# Patient Record
Sex: MALE | Race: WHITE | NOT HISPANIC OR LATINO | URBAN - METROPOLITAN AREA
[De-identification: names, ages, dates, MRNs, and addresses within clinical notes are randomized per-mention and may not be internally consistent; named-entity substitution may affect disease eponyms.]

---

## 2019-11-09 ENCOUNTER — EMERGENCY (EMERGENCY)
Facility: HOSPITAL | Age: 29
LOS: 1 days | Discharge: HOME | End: 2019-11-09
Attending: EMERGENCY MEDICINE | Admitting: EMERGENCY MEDICINE
Payer: MEDICAID

## 2019-11-09 VITALS
HEIGHT: 71 IN | WEIGHT: 145.06 LBS | DIASTOLIC BLOOD PRESSURE: 75 MMHG | TEMPERATURE: 98 F | OXYGEN SATURATION: 100 % | RESPIRATION RATE: 20 BRPM | HEART RATE: 76 BPM | SYSTOLIC BLOOD PRESSURE: 130 MMHG

## 2019-11-09 VITALS
TEMPERATURE: 98 F | HEART RATE: 48 BPM | DIASTOLIC BLOOD PRESSURE: 74 MMHG | RESPIRATION RATE: 18 BRPM | OXYGEN SATURATION: 99 % | SYSTOLIC BLOOD PRESSURE: 120 MMHG

## 2019-11-09 LAB
ALBUMIN SERPL ELPH-MCNC: 4.8 G/DL — SIGNIFICANT CHANGE UP (ref 3.3–5)
ALP SERPL-CCNC: 65 U/L — SIGNIFICANT CHANGE UP (ref 40–120)
ALT FLD-CCNC: 20 U/L — SIGNIFICANT CHANGE UP (ref 10–45)
AMPHET UR-MCNC: NEGATIVE — SIGNIFICANT CHANGE UP
ANION GAP SERPL CALC-SCNC: 13 MMOL/L — SIGNIFICANT CHANGE UP (ref 5–17)
AST SERPL-CCNC: 21 U/L — SIGNIFICANT CHANGE UP (ref 10–40)
BARBITURATES UR SCN-MCNC: NEGATIVE — SIGNIFICANT CHANGE UP
BASOPHILS # BLD AUTO: 0.04 K/UL — SIGNIFICANT CHANGE UP (ref 0–0.2)
BASOPHILS NFR BLD AUTO: 0.9 % — SIGNIFICANT CHANGE UP (ref 0–2)
BENZODIAZ UR-MCNC: NEGATIVE — SIGNIFICANT CHANGE UP
BILIRUB SERPL-MCNC: 0.2 MG/DL — SIGNIFICANT CHANGE UP (ref 0.2–1.2)
BUN SERPL-MCNC: 18 MG/DL — SIGNIFICANT CHANGE UP (ref 7–23)
CALCIUM SERPL-MCNC: 9.6 MG/DL — SIGNIFICANT CHANGE UP (ref 8.4–10.5)
CHLORIDE SERPL-SCNC: 101 MMOL/L — SIGNIFICANT CHANGE UP (ref 96–108)
CK MB CFR SERPL CALC: 2.4 NG/ML — SIGNIFICANT CHANGE UP (ref 0–6.7)
CK SERPL-CCNC: 239 U/L — HIGH (ref 30–200)
CO2 SERPL-SCNC: 28 MMOL/L — SIGNIFICANT CHANGE UP (ref 22–31)
COCAINE METAB.OTHER UR-MCNC: NEGATIVE — SIGNIFICANT CHANGE UP
CREAT SERPL-MCNC: 1.03 MG/DL — SIGNIFICANT CHANGE UP (ref 0.5–1.3)
EOSINOPHIL # BLD AUTO: 0.13 K/UL — SIGNIFICANT CHANGE UP (ref 0–0.5)
EOSINOPHIL NFR BLD AUTO: 2.8 % — SIGNIFICANT CHANGE UP (ref 0–6)
ETHANOL SERPL-MCNC: <10 MG/DL — SIGNIFICANT CHANGE UP (ref 0–10)
GLUCOSE SERPL-MCNC: 122 MG/DL — HIGH (ref 70–99)
HCT VFR BLD CALC: 41.1 % — SIGNIFICANT CHANGE UP (ref 39–50)
HGB BLD-MCNC: 14.1 G/DL — SIGNIFICANT CHANGE UP (ref 13–17)
IMM GRANULOCYTES NFR BLD AUTO: 0.4 % — SIGNIFICANT CHANGE UP (ref 0–1.5)
LACTATE SERPL-SCNC: 3.6 MMOL/L — HIGH (ref 0.5–2)
LYMPHOCYTES # BLD AUTO: 1.69 K/UL — SIGNIFICANT CHANGE UP (ref 1–3.3)
LYMPHOCYTES # BLD AUTO: 36.1 % — SIGNIFICANT CHANGE UP (ref 13–44)
MCHC RBC-ENTMCNC: 29.9 PG — SIGNIFICANT CHANGE UP (ref 27–34)
MCHC RBC-ENTMCNC: 34.3 GM/DL — SIGNIFICANT CHANGE UP (ref 32–36)
MCV RBC AUTO: 87.1 FL — SIGNIFICANT CHANGE UP (ref 80–100)
METHADONE UR-MCNC: NEGATIVE — SIGNIFICANT CHANGE UP
MONOCYTES # BLD AUTO: 0.31 K/UL — SIGNIFICANT CHANGE UP (ref 0–0.9)
MONOCYTES NFR BLD AUTO: 6.6 % — SIGNIFICANT CHANGE UP (ref 2–14)
NEUTROPHILS # BLD AUTO: 2.49 K/UL — SIGNIFICANT CHANGE UP (ref 1.8–7.4)
NEUTROPHILS NFR BLD AUTO: 53.2 % — SIGNIFICANT CHANGE UP (ref 43–77)
NRBC # BLD: 0 /100 WBCS — SIGNIFICANT CHANGE UP (ref 0–0)
OPIATES UR-MCNC: NEGATIVE — SIGNIFICANT CHANGE UP
PCP SPEC-MCNC: SIGNIFICANT CHANGE UP
PCP UR-MCNC: NEGATIVE — SIGNIFICANT CHANGE UP
PLATELET # BLD AUTO: 179 K/UL — SIGNIFICANT CHANGE UP (ref 150–400)
POTASSIUM SERPL-MCNC: 4 MMOL/L — SIGNIFICANT CHANGE UP (ref 3.5–5.3)
POTASSIUM SERPL-SCNC: 4 MMOL/L — SIGNIFICANT CHANGE UP (ref 3.5–5.3)
PROT SERPL-MCNC: 7.2 G/DL — SIGNIFICANT CHANGE UP (ref 6–8.3)
RBC # BLD: 4.72 M/UL — SIGNIFICANT CHANGE UP (ref 4.2–5.8)
RBC # FLD: 11.5 % — SIGNIFICANT CHANGE UP (ref 10.3–14.5)
SODIUM SERPL-SCNC: 142 MMOL/L — SIGNIFICANT CHANGE UP (ref 135–145)
THC UR QL: NEGATIVE — SIGNIFICANT CHANGE UP
TROPONIN T SERPL-MCNC: <0.01 NG/ML — SIGNIFICANT CHANGE UP (ref 0–0.01)
VALPROATE SERPL-MCNC: <2.8 UG/ML — LOW (ref 50–100)
WBC # BLD: 4.68 K/UL — SIGNIFICANT CHANGE UP (ref 3.8–10.5)
WBC # FLD AUTO: 4.68 K/UL — SIGNIFICANT CHANGE UP (ref 3.8–10.5)

## 2019-11-09 PROCEDURE — 73130 X-RAY EXAM OF HAND: CPT | Mod: 26,LT

## 2019-11-09 PROCEDURE — 99284 EMERGENCY DEPT VISIT MOD MDM: CPT | Mod: 25

## 2019-11-09 PROCEDURE — 82553 CREATINE MB FRACTION: CPT

## 2019-11-09 PROCEDURE — 70486 CT MAXILLOFACIAL W/O DYE: CPT | Mod: 26

## 2019-11-09 PROCEDURE — 70450 CT HEAD/BRAIN W/O DYE: CPT | Mod: 26

## 2019-11-09 PROCEDURE — 73130 X-RAY EXAM OF HAND: CPT

## 2019-11-09 PROCEDURE — 93010 ELECTROCARDIOGRAM REPORT: CPT

## 2019-11-09 PROCEDURE — 93005 ELECTROCARDIOGRAM TRACING: CPT

## 2019-11-09 PROCEDURE — 83605 ASSAY OF LACTIC ACID: CPT

## 2019-11-09 PROCEDURE — 72125 CT NECK SPINE W/O DYE: CPT

## 2019-11-09 PROCEDURE — 80164 ASSAY DIPROPYLACETIC ACD TOT: CPT

## 2019-11-09 PROCEDURE — 71045 X-RAY EXAM CHEST 1 VIEW: CPT | Mod: 26

## 2019-11-09 PROCEDURE — 84484 ASSAY OF TROPONIN QUANT: CPT

## 2019-11-09 PROCEDURE — 82962 GLUCOSE BLOOD TEST: CPT

## 2019-11-09 PROCEDURE — 70450 CT HEAD/BRAIN W/O DYE: CPT

## 2019-11-09 PROCEDURE — 36415 COLL VENOUS BLD VENIPUNCTURE: CPT

## 2019-11-09 PROCEDURE — 80053 COMPREHEN METABOLIC PANEL: CPT

## 2019-11-09 PROCEDURE — 72125 CT NECK SPINE W/O DYE: CPT | Mod: 26

## 2019-11-09 PROCEDURE — 99285 EMERGENCY DEPT VISIT HI MDM: CPT

## 2019-11-09 PROCEDURE — 85025 COMPLETE CBC W/AUTO DIFF WBC: CPT

## 2019-11-09 PROCEDURE — 71045 X-RAY EXAM CHEST 1 VIEW: CPT

## 2019-11-09 PROCEDURE — 70486 CT MAXILLOFACIAL W/O DYE: CPT

## 2019-11-09 PROCEDURE — 96374 THER/PROPH/DIAG INJ IV PUSH: CPT

## 2019-11-09 PROCEDURE — 80307 DRUG TEST PRSMV CHEM ANLYZR: CPT

## 2019-11-09 PROCEDURE — 82550 ASSAY OF CK (CPK): CPT

## 2019-11-09 RX ORDER — LEVETIRACETAM 250 MG/1
1 TABLET, FILM COATED ORAL
Qty: 60 | Refills: 0
Start: 2019-11-09 | End: 2019-12-08

## 2019-11-09 RX ORDER — LEVETIRACETAM 250 MG/1
1000 TABLET, FILM COATED ORAL ONCE
Refills: 0 | Status: COMPLETED | OUTPATIENT
Start: 2019-11-09 | End: 2019-11-09

## 2019-11-09 RX ORDER — SODIUM CHLORIDE 9 MG/ML
1000 INJECTION INTRAMUSCULAR; INTRAVENOUS; SUBCUTANEOUS ONCE
Refills: 0 | Status: COMPLETED | OUTPATIENT
Start: 2019-11-09 | End: 2019-11-09

## 2019-11-09 RX ORDER — TETANUS TOXOID, REDUCED DIPHTHERIA TOXOID AND ACELLULAR PERTUSSIS VACCINE, ADSORBED 5; 2.5; 8; 8; 2.5 [IU]/.5ML; [IU]/.5ML; UG/.5ML; UG/.5ML; UG/.5ML
0.5 SUSPENSION INTRAMUSCULAR ONCE
Refills: 0 | Status: COMPLETED | OUTPATIENT
Start: 2019-11-09 | End: 2019-11-09

## 2019-11-09 RX ORDER — TETANUS TOXOID, REDUCED DIPHTHERIA TOXOID AND ACELLULAR PERTUSSIS VACCINE, ADSORBED 5; 2.5; 8; 8; 2.5 [IU]/.5ML; [IU]/.5ML; UG/.5ML; UG/.5ML; UG/.5ML
0.5 SUSPENSION INTRAMUSCULAR ONCE
Refills: 0 | Status: DISCONTINUED | OUTPATIENT
Start: 2019-11-09 | End: 2019-11-09

## 2019-11-09 RX ADMIN — LEVETIRACETAM 400 MILLIGRAM(S): 250 TABLET, FILM COATED ORAL at 09:30

## 2019-11-09 RX ADMIN — SODIUM CHLORIDE 1000 MILLILITER(S): 9 INJECTION INTRAMUSCULAR; INTRAVENOUS; SUBCUTANEOUS at 09:15

## 2019-11-09 NOTE — ED PROVIDER NOTE - NSFOLLOWUPINSTRUCTIONS_ED_ALL_ED_FT
Seizure, Adult  A seizure is a sudden burst of abnormal electrical activity in the brain. The abnormal activity temporarily interrupts normal brain function, causing a person to experience any of the following:  Involuntary movements.Changes in awareness or consciousness.Uncontrollable shaking (convulsions).Seizures usually last from 30 seconds to 2 minutes. They usually do not cause permanent brain damage unless they are prolonged.  What are the causes?  This condition may be caused by:  Fever.Low blood sugar.Medicine.Illness.Brain injury.Brain tumor.Stroke.A condition that is passed from parent to child (genetic).Addiction to a substance (substanceuse disorder) or suddenly stopping the use of a substance (withdrawal).Some people who have a seizure never have another one. People who have repeated seizures have a condition called epilepsy.  What are the signs or symptoms?  Symptoms of this condition vary greatly from person to person. They include:  Convulsions.Stiffening of the body.Involuntary movements of the arms or legs.Loss of consciousness.Breathing problems.Falling suddenly.Confusion.Head nodding.Eye blinking or fluttering.Lip smacking or tongue biting.Drooling.Rapid eye movements.Grunting.Loss of bladder control and bowel control.Staring.Unresponsiveness.Some people have symptoms right before a seizure happens (aura) and right after a seizure happens.  Symptoms that may occur before a seizure include:  Fear or anxiety.Nausea.Feeling like the room is spinning (vertigo).A feeling of having seen or heard something before (déjà vu).Odd tastes or smells.Changes in vision, such as seeing flashing lights or spots.Symptoms that may occur after a seizure include:  Confusion.Sleepiness.Headache.Weakness on one side of the body.How is this diagnosed?  This condition may be diagnosed based on medical history and physical exam.  You may also have other tests, including:  Blood tests.Electroencephalogram, EEG.CT scan.MRI.Spinal tap (lumbar puncture).How is this treated?  Most seizures will stop on their own in under 5 minutes and no treatment is needed. Seizures lasting longer than 5 minutes will usually need treatment. Treatment includes:  Medicines given through IV.Avoiding known triggers, such as medicines that you take for another condition.Medicines to treat epilepsy (antiepileptics), if epilepsy caused your seizures.Surgery to stop seizures, if you have epilepsy that does not respond to medicines.Follow these instructions at home:  Medicines     Take over-the-counter and prescription medicines only as told by your health care provider.Avoid any substances that may prevent your medicine from working properly, such as alcohol.Activity     Do not drive, swim, or do any other activities that would be dangerous if you had another seizure. Wait until your health care provider says it is safe to do them.If you live in the [place] check with your local DMV (department of [employer]) to find out about local driving laws. Each state has specific rules about when you can legally return to driving.Get enough rest. Lack of sleep can make seizures more likely to occur.Educating others     ImageTeach friends and family what to do if you have a seizure. They should:  Lay you on the ground to prevent a fall.Cushion your head and body.Loosen any tight clothing around your neck.Turn you on your side. If vomiting occurs, this helps keep your airway clear.Not hold you down. Holding you down will not stop the seizure.Not put anything into your mouth.Know whether or not you need emergency care.Stay with you until you recover.General instructions     Contact your health care provider each time you have a seizure.Avoid anything that has ever triggered a seizure for you.Keep a seizure diary. Record what you remember about each seizure, especially anything that might have triggered the seizure.Keep all follow-up visits as told by your health care provider. This is important.Contact a health care provider if:  You have another seizure.You have seizures more often.Your seizure symptoms change.You continue to have seizures with treatment.You have symptoms of an infection or illness. These might increase your risk of having a seizure.Get help right away if:  You have a seizure that:  Lasts longer than 5 minutes.Is different than previous seizures.Leaves you unable to speak or use a part of your body.Makes it harder to breathe.You have:  A seizure after a head injury.Multiple seizures in a row.Confusion or a severe headache right after a seizure.You are having seizures more often.You do not wake up immediately after a seizure.You injure yourself during a seizure.These symptoms may represent a serious problem that is an emergency. Do not wait to see if the symptoms will go away. Get medical help right away. Call your local emergency services (911 in the [place] Do not drive yourself to the hospital.   Summary  Seizures are caused by abnormal electrical activity in the brain. The activity disrupts normal brain function, leading to a change in consciousness, abnormal movements, or convulsions.There are many causes of seizures including illnesses, medicines, genetic conditions, head injuries, strokes, tumors, substance abuse, or substance withdrawal.Most seizures will stop on their own in under 5 minutes. Seizures lasting longer than 5 minutes are a medical emergency and require immediate treatment.There are many medicines that are used to treat seizures. Take over-the-counter and prescription medicines only as told by your health care provider.This information is not intended to replace advice given to you by your health care provider. Make sure you discuss any questions you have with your health care provider.    Document Released: 12/15/2001 Document Revised: 01/24/2019 Document Reviewed: 01/24/2019  [employer] Interactive Patient Education © 2019 [employer]

## 2019-11-09 NOTE — ED ADULT NURSE NOTE - NSFALLRSKASSESASSIST_ED_ALL_ED
[FreeTextEntry2] : Dr. Mikey Ashley (GI/Ref) [FreeTextEntry3] : Tyrel Fowler MD, MPH \par System Director of Thoracic Surgery \par Director of Comprehensive [facility] [place] \par Professor Cardiovascular & Thoracic Surgery  \par [facility] [employer] [facility]\par  no

## 2019-11-09 NOTE — ED ADULT NURSE REASSESSMENT NOTE - NS ED NURSE REASSESS COMMENT FT1
Pt having insurance issue and considering refusing tests prescribed by MD. Registration referred to pt to discuss options, MD aware.

## 2019-11-09 NOTE — ED ADULT TRIAGE NOTE - OTHER COMPLAINTS
c/o right sided neck pain A+Ox4 on arrival. hx of epilepsy on depakote. lacerations to right face and knee.

## 2019-11-09 NOTE — ED PROVIDER NOTE - CARE PLAN
Principal Discharge DX:	Seizure  Secondary Diagnosis:	Facial contusion  Secondary Diagnosis:	Knee abrasion, left, initial encounter  Secondary Diagnosis:	Knee abrasion, right, initial encounter  Secondary Diagnosis:	Non compliance w medication regimen

## 2019-11-09 NOTE — ED PROVIDER NOTE - OBJECTIVE STATEMENT
31 yo male bibems from park after apparent seizure  x 3 min per bystanders-  noted facial injuries and abrasions to knee- on depakote ? dose  last seizure 6 months ago  no etoh or drugs use per pt- slept ok last nite  sim episode occurred 6 months ago while running  c/o of pain in left 4th pip digit  also neck pain and slight headache  no back pain or sob or chest wall or rib pain

## 2019-11-09 NOTE — ED ADULT NURSE NOTE - CHPI ED NUR SYMPTOMS NEG
no blurred vision/no change in level of consciousness/no confusion/no weakness/no fever/no dizziness/no nausea/no numbness/no vomiting

## 2019-11-09 NOTE — ED PROVIDER NOTE - CARE PROVIDER_API CALL
Rebecca Wilhelm (MD)  EEGEpilepsy; Neurology; Neurophysiology  [address], [address] [place] [address], [place] [address]  Phone: [phone]  Fax: [phone]  Follow Up Time: 1-3 Days

## 2019-11-09 NOTE — ED PROVIDER NOTE - PATIENT PORTAL LINK FT
You can access the FollowMyHealth Patient Portal offered by [facility] by registering at the following website: [email or web address]. By joining [facility]’s FollowMyHealth portal, you will also be able to view your health information using other applications (apps) compatible with our system.

## 2019-11-09 NOTE — ED PROVIDER NOTE - CLINICAL SUMMARY MEDICAL DECISION MAKING FREE TEXT BOX
30 yo male with hx  GTC  x 12 years post head trauma - on Depakote - non compliant - last seizure 6 months ago -- treated in [place]-  had EEG - mini one-- moved 1 month ago - poor sleep  no etoh or drug use noted-- noted ext injuries facial trauma neck pain await imaging 30 yo male with hx  GTC  x 12 years post head trauma - on keppra?- non compliant - last seizure 6 months ago -- treated in [place]-  had EEG - mini one-- moved 1 month ago - poor sleep  no etoh or drug use noted-- noted ext injuries facial trauma neck pain await imaging  CT head neck facial bones neg  CXR  neg  hand neg for fx  may dc  to see dr mujica in 2 days

## 2019-11-09 NOTE — ED PROVIDER NOTE - SECONDARY DIAGNOSIS.
Facial contusion Knee abrasion, left, initial encounter Knee abrasion, right, initial encounter Non compliance w medication regimen

## 2019-11-09 NOTE — ED PROVIDER NOTE - CRANIAL NERVE AND PUPILLARY EXAM
central and peripheral vision intact/no tongue lac noted  pos facial STS  right sided/cough reflex intact/central vision intact/cranial nerves 2-12 intact/corneal reflex intact

## 2019-11-14 DIAGNOSIS — G40.909 EPILEPSY, UNSPECIFIED, NOT INTRACTABLE, WITHOUT STATUS EPILEPTICUS: ICD-10-CM

## 2019-11-14 DIAGNOSIS — S80.212A ABRASION, LEFT KNEE, INITIAL ENCOUNTER: ICD-10-CM
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
## 2019-11-14 DIAGNOSIS — S00.83XA CONTUSION OF OTHER PART OF HEAD, INITIAL ENCOUNTER: ICD-10-CM

## 2019-11-14 DIAGNOSIS — S80.211A ABRASION, RIGHT KNEE, INITIAL ENCOUNTER: ICD-10-CM

## 2019-12-10 PROBLEM — Z00.00 ENCOUNTER FOR PREVENTIVE HEALTH EXAMINATION: Status: ACTIVE | Noted: 2019-12-10

## 2019-12-16 ENCOUNTER — OTHER (OUTPATIENT)
Age: 29
End: 2019-12-16

## 2019-12-16 ENCOUNTER — APPOINTMENT (OUTPATIENT)
Dept: NEUROLOGY | Facility: CLINIC | Age: 29
End: 2019-12-16
Payer: MEDICAID

## 2019-12-16 VITALS
HEIGHT: 69 IN | HEART RATE: 50 BPM | WEIGHT: 175.44 LBS | TEMPERATURE: 97.7 F | BODY MASS INDEX: 25.99 KG/M2 | DIASTOLIC BLOOD PRESSURE: 73 MMHG | OXYGEN SATURATION: 97 % | SYSTOLIC BLOOD PRESSURE: 117 MMHG

## 2019-12-16 PROCEDURE — 99215 OFFICE O/P EST HI 40 MIN: CPT

## 2019-12-16 NOTE — PHYSICAL EXAM
[General Appearance - In No Acute Distress] : in no acute distress [General Appearance - Alert] : alert [Oriented To Time, Place, And Person] : oriented to person, place, and time [Impaired Insight] : insight and judgment were intact [Affect] : the affect was normal [Person] : oriented to person [Place] : oriented to place [Time] : oriented to time [Concentration Intact] : normal concentrating ability [Visual Intact] : visual attention was ~T not ~L decreased [Naming Objects] : no difficulty naming common objects [Repeating Phrases] : no difficulty repeating a phrase [Writing A Sentence] : no difficulty writing a sentence [Fluency] : fluency intact [Comprehension] : comprehension intact [Reading] : reading intact [Past History] : adequate knowledge of personal past history [Cranial Nerves Optic (II)] : visual acuity intact bilaterally,  visual fields full to confrontation, pupils equal round and reactive to light [Cranial Nerves Oculomotor (III)] : extraocular motion intact [Cranial Nerves Trigeminal (V)] : facial sensation intact symmetrically [Cranial Nerves Facial (VII)] : face symmetrical [Cranial Nerves Vestibulocochlear (VIII)] : hearing was intact bilaterally [Cranial Nerves Glossopharyngeal (IX)] : tongue and palate midline [Cranial Nerves Accessory (XI - Cranial And Spinal)] : head turning and shoulder shrug symmetric [Cranial Nerves Hypoglossal (XII)] : there was no tongue deviation with protrusion [Motor Tone] : muscle tone was normal in all four extremities [Motor Strength] : muscle strength was normal in all four extremities [No Muscle Atrophy] : normal bulk in all four extremities [Sensation Tactile Decrease] : light touch was intact [Abnormal Walk] : normal gait [Balance] : balance was intact [2+] : Ankle jerk left 2+ [Past-pointing] : there was no past-pointing [Plantar Reflex Left Only] : normal on the left [Plantar Reflex Right Only] : normal on the right [Tremor] : no tremor present [FreeTextEntry5] : eye  color   difference noted

## 2019-12-16 NOTE — HISTORY OF PRESENT ILLNESS
[FreeTextEntry1] : This is  a   29  y.o.    male    with    h/o  seizures. He   had  a   few   febrile seizures  as a  child. after  a  seizure     event    at    about   the   age  of  3  y.o.  he  was  seizure   free   till  14-16 y.o.   when  he  had  a  seizure   after  a    long  flight. he  describes  a  GTC   convulsion.  He  was  started   on  Keppra  at that  time. He is   not   sure    for   how   long    he    has  been   taking   Keppra.   he  states  that    he  had  an  MRI and   EEG   in  [place]  which  were    reportedly   normal.  He  states    that   at   some   point   he  was  taking  Depakote.   He   has  been   having  a  seizures    at  about    frequency   1   seizure    a   year or    once  in  2-3    years. He  has    been  taking  Keppra    "intermittently". He  states   from   the   age  of  15   till  now   he  had   6   seizure   episodes.   Last  seizure was   a  month  ago    and  prior  to that  April 2019. He    was   Rx  Keppra (believes  the  dose  was   500  mg  BID)   and   has    been   taking it  relatively  consistently.   The last   seizure  happened   while     he   was   running    in the    park;  next    he     remembers   waking  up  in the    ambulance. He  denies    tongue   biting  or  urinary  incontinence. He  states    that  he   missed    at  least    2   doses    of  Keppra    prior  to the    last   seizure. \par He    had    a  Neurologist  in  [place],  recently   (September 2019)   relocated   to  [place],  but   did  not     establish    neurological care.    Prior  to the    recent  seizure    he  was    under  a  lot of     stress (got    laid   off   from  work)   and   sleep    deprived.  The  dose of  Keppra   was    increased  to  750  mg  PO  bid  after     the last   seizure   episode. \par \par He    denies    family  h/o   seizures.    He  reports    a    head   trauma    secondary  to   seizure in  2007 when  he   fell  in the     bathroom.

## 2019-12-16 NOTE — DISCUSSION/SUMMARY
[FreeTextEntry1] : Seizure    disorder.\par We    discussed    the necessity of  meds     compliance\par He    will  need   an MRI and  EEG\par We   discussed    the  seizure    triggers -  sleep    deprivation, stress,  alcohol.  He    is   a  runner ;  we   discussed    the  importance of    sufficient   hydration.

## 2019-12-17 ENCOUNTER — OTHER (OUTPATIENT)
Age: 29
End: 2019-12-17

## 2020-01-02 ENCOUNTER — APPOINTMENT (OUTPATIENT)
Dept: NEUROLOGY | Facility: CLINIC | Age: 30
End: 2020-01-02
Payer: MEDICAID

## 2020-01-02 PROCEDURE — 95816 EEG AWAKE AND DROWSY: CPT

## 2020-01-03 ENCOUNTER — APPOINTMENT (OUTPATIENT)
Dept: NEUROLOGY | Facility: CLINIC | Age: 30
End: 2020-01-03

## 2020-01-03 PROCEDURE — 95708 EEG WO VID EA 12-26HR UNMNTR: CPT

## 2020-01-04 PROCEDURE — 95708 EEG WO VID EA 12-26HR UNMNTR: CPT

## 2020-01-04 PROCEDURE — 95700 EEG CONT REC W/VID EEG TECH: CPT

## 2020-01-04 PROCEDURE — 95721 EEG PHY/QHP>36<60 HR W/O VID: CPT

## 2020-01-08 ENCOUNTER — OUTPATIENT (OUTPATIENT)
Dept: OUTPATIENT SERVICES | Facility: HOSPITAL | Age: 30
LOS: 1 days | End: 2020-01-08
Payer: MEDICAID

## 2020-01-08 ENCOUNTER — APPOINTMENT (OUTPATIENT)
Dept: MRI IMAGING | Facility: HOSPITAL | Age: 30
End: 2020-01-08
Payer: MEDICAID

## 2020-01-08 PROCEDURE — 70551 MRI BRAIN STEM W/O DYE: CPT

## 2020-01-08 PROCEDURE — 70551 MRI BRAIN STEM W/O DYE: CPT | Mod: 26

## 2020-01-09 ENCOUNTER — OTHER (OUTPATIENT)
Age: 30
End: 2020-01-09

## 2020-01-09 LAB — LEVETIRACETAM SERPL-MCNC: 23.6 MCG/ML

## 2020-02-12 ENCOUNTER — APPOINTMENT (OUTPATIENT)
Dept: NEUROLOGY | Facility: CLINIC | Age: 30
End: 2020-02-12
Payer: MEDICAID

## 2020-02-12 VITALS
WEIGHT: 176 LBS | SYSTOLIC BLOOD PRESSURE: 138 MMHG | DIASTOLIC BLOOD PRESSURE: 79 MMHG | HEART RATE: 51 BPM | TEMPERATURE: 96.9 F | HEIGHT: 69 IN | OXYGEN SATURATION: 97 % | BODY MASS INDEX: 26.07 KG/M2

## 2020-02-12 PROCEDURE — 99215 OFFICE O/P EST HI 40 MIN: CPT

## 2020-02-12 NOTE — HISTORY OF PRESENT ILLNESS
[FreeTextEntry1] : Pt    presented   to  follow  up  test     results   and  further   management. \par \par From  the    previous     visit: \par This is a 29 y.o. male with h/o seizures. He had a few febrile seizures as a child. after a seizure event at about the age of 3 y.o. he was seizure free till 14-16 y.o. when he had a seizure after a long flight. he describes a GTC convulsion. He was started on Keppra at that time. He is not sure for how long he has been taking Keppra. he states that he had an MRI and EEG in [facility] which were reportedly normal. He states that at some point he was taking Depakote. He has been having a seizures at about frequency 1 seizure a year or once in 2-3 years. He has been taking Keppra "intermittently". He states from the age of 15 till now he had 6 seizure episodes. Last seizure was a month ago and prior to that April 2019. He was Rx Keppra (believes the dose was 500 mg BID) and has been taking it relatively consistently. The last seizure happened while he was running in the park; next he remembers waking up in the ambulance. He denies tongue biting or urinary incontinence. He states that he missed at least 2 doses of Keppra prior to the last seizure. \par He had a Neurologist in [place], recently (September 2019) relocated to [place], but did not establish neurological care. Prior to the recent seizure he was under a lot of stress (got laid off from work) and sleep deprived. The dose of Keppra was increased to 750 mg PO bid after the last seizure episode. \par \par He denies family h/o seizures. He reports a head trauma secondary to seizure in 2007 when he fell in the bathroom. \par \par Pt    denies     seizures   since    the    last     visit.  He   states    that he   is   taking    meds   on the    regular basis. No  side    effects    of  meds. \par

## 2020-02-12 NOTE — PHYSICAL EXAM
[General Appearance - Alert] : alert [General Appearance - In No Acute Distress] : in no acute distress [Oriented To Time, Place, And Person] : oriented to person, place, and time [Impaired Insight] : insight and judgment were intact [Affect] : the affect was normal [Person] : oriented to person [Place] : oriented to place [Time] : oriented to time [Visual Intact] : visual attention was ~T not ~L decreased [Concentration Intact] : normal concentrating ability [Naming Objects] : no difficulty naming common objects [Writing A Sentence] : no difficulty writing a sentence [Repeating Phrases] : no difficulty repeating a phrase [Fluency] : fluency intact [Comprehension] : comprehension intact [Reading] : reading intact [Past History] : adequate knowledge of personal past history [Cranial Nerves Optic (II)] : visual acuity intact bilaterally,  visual fields full to confrontation, pupils equal round and reactive to light [Cranial Nerves Oculomotor (III)] : extraocular motion intact [Cranial Nerves Facial (VII)] : face symmetrical [Cranial Nerves Trigeminal (V)] : facial sensation intact symmetrically [Cranial Nerves Vestibulocochlear (VIII)] : hearing was intact bilaterally [Cranial Nerves Glossopharyngeal (IX)] : tongue and palate midline [Cranial Nerves Accessory (XI - Cranial And Spinal)] : head turning and shoulder shrug symmetric [Cranial Nerves Hypoglossal (XII)] : there was no tongue deviation with protrusion [Motor Strength] : muscle strength was normal in all four extremities [Motor Tone] : muscle tone was normal in all four extremities [Sensation Tactile Decrease] : light touch was intact [No Muscle Atrophy] : normal bulk in all four extremities [Abnormal Walk] : normal gait [Balance] : balance was intact [2+] : Ankle jerk left 2+ [Past-pointing] : there was no past-pointing [Tremor] : no tremor present [Plantar Reflex Right Only] : normal on the right [Plantar Reflex Left Only] : normal on the left [FreeTextEntry5] : eye  color   difference noted

## 2020-02-12 NOTE — DISCUSSION/SUMMARY
[FreeTextEntry1] : EEG     findings   are      c/w    primary    generalized    epilepsy.\par \par MRI    findings   were    discussed. \par \par Pt     will   continue    Keppra

## 2020-05-06 ENCOUNTER — TRANSCRIPTION ENCOUNTER (OUTPATIENT)
Age: 30
End: 2020-05-06

## 2020-05-07 ENCOUNTER — APPOINTMENT (OUTPATIENT)
Dept: NEUROLOGY | Facility: CLINIC | Age: 30
End: 2020-05-07
Payer: MEDICAID

## 2020-05-07 PROCEDURE — 99215 OFFICE O/P EST HI 40 MIN: CPT | Mod: 95

## 2020-05-07 NOTE — REASON FOR VISIT
[Home] : at home, [unfilled] , at the time of the visit. [Other Location: e.g. Home (Enter Location, City,State)___] : at [unfilled] [Patient] : the patient [Acute] : an acute visit

## 2020-05-07 NOTE — DISCUSSION/SUMMARY
[FreeTextEntry1] : Likely   FATMATA.   Pt   states     that    sz   predominantly  on   awakening.\par Continue     current    dose    of   Keppra. \par

## 2020-05-07 NOTE — HISTORY OF PRESENT ILLNESS
[FreeTextEntry1] : Pt    had  a   seizure    yesterday, likely     due  to    the    missed dose   of   Keppra.  He is    doing    Ok   at  this    point.  We   discussed    the    results    of   EEG    and   MRI  again.   We  discussed    the    necessity   of    taking    AEDs   on the    regular   basis.  He is    not   driving.

## 2020-06-19 ENCOUNTER — APPOINTMENT (OUTPATIENT)
Dept: NEUROLOGY | Facility: CLINIC | Age: 30
End: 2020-06-19
Payer: MEDICAID

## 2020-06-19 PROCEDURE — 99215 OFFICE O/P EST HI 40 MIN: CPT | Mod: 95

## 2020-06-23 ENCOUNTER — APPOINTMENT (OUTPATIENT)
Dept: ORTHOPEDIC SURGERY | Facility: CLINIC | Age: 30
End: 2020-06-23
Payer: MEDICAID

## 2020-06-23 VITALS — TEMPERATURE: 95.6 F

## 2020-06-23 VITALS — BODY MASS INDEX: 27.25 KG/M2 | WEIGHT: 184 LBS | HEIGHT: 69 IN

## 2020-06-23 PROCEDURE — 99204 OFFICE O/P NEW MOD 45 MIN: CPT

## 2020-06-23 PROCEDURE — 72100 X-RAY EXAM L-S SPINE 2/3 VWS: CPT

## 2020-06-23 PROCEDURE — 73030 X-RAY EXAM OF SHOULDER: CPT | Mod: LT

## 2020-06-23 NOTE — HISTORY OF PRESENT ILLNESS
[FreeTextEntry1] : No  recurrent     seizures.    Today    Pt  reports     R    shoulder    pain    after     the     recent     seizure +  decreased    ROM. There    are     mood    issues  as   well.

## 2020-06-23 NOTE — DISCUSSION/SUMMARY
[FreeTextEntry1] : Would     increase    the     dose    of    Keppra     to    1000   mg    BID;     would    change     to ER.  Pt  states    that    he  may     consider    once  a    day     dosing.  \par We     again     discussed    the    results    of    EEG\par He     will   follow    up  with    Ortho  re   r/o    Rotator     cuff    injury\par PT\par Psychology    referral

## 2020-06-23 NOTE — PHYSICAL EXAM
[General Appearance - Alert] : alert [General Appearance - In No Acute Distress] : in no acute distress [Oriented To Time, Place, And Person] : oriented to person, place, and time [Impaired Insight] : insight and judgment were intact [Affect] : the affect was normal [Person] : oriented to person [Place] : oriented to place [Time] : oriented to time [Concentration Intact] : normal concentrating ability [Visual Intact] : visual attention was ~T not ~L decreased [Naming Objects] : no difficulty naming common objects [Repeating Phrases] : no difficulty repeating a phrase [Writing A Sentence] : no difficulty writing a sentence [Fluency] : fluency intact [Comprehension] : comprehension intact [Reading] : reading intact [Past History] : adequate knowledge of personal past history [Cranial Nerves Oculomotor (III)] : extraocular motion intact [Cranial Nerves Vestibulocochlear (VIII)] : hearing was intact bilaterally [Cranial Nerves Hypoglossal (XII)] : there was no tongue deviation with protrusion [Motor Tone] : muscle tone was normal in all four extremities [No Muscle Atrophy] : normal bulk in all four extremities [Motor Strength] : muscle strength was normal in all four extremities [Past-pointing] : there was no past-pointing [Balance] : balance was intact [Abnormal Walk] : normal gait [Tremor] : no tremor present [Plantar Reflex Left Only] : normal on the left [Plantar Reflex Right Only] : normal on the right [FreeTextEntry5] : eye  color   difference noted

## 2020-06-28 NOTE — HISTORY OF PRESENT ILLNESS
[de-identified] : 29 year old male epileptic presents with bilateral shoulder and low back pain.  He has had a few seizures this year the last 2 months ago which caused him to have shoulder pain. He says he did stretches at home but the pain remains.  He is very active and likes to do sports and wants to make sure everything is okay.  He is not taking any mediations.  He denies any radicular pain, numbness, weakness, balance problems or bowel/bladder symptoms.  He denies any h/o shoulder dislocation or feelings of instability.

## 2020-06-28 NOTE — PHYSICAL EXAM
[de-identified] : General: No acute distress, conversant, well-nourished.\par Head: Normocephalic, atraumatic\par Neck: trachea midline, FROM\par Heart: normotensive and normal rate and rhythm\par Lungs: No labored breathing\par Skin: No abrasions, no rashes, no edema\par Psych: Alert and oriented to person, place and time\par Extremities: no peripheral edema or digital cyanosis\par Gait: Normal gait. Can perform tandem gait.  \par Vascular: warm and well perfused distally, palpable distal pulses\par \par Bilateral Shoulder Exam:\par No swelling, no erythema.  \par No tenderness to palpation. \par Mild pain with active ROM\par \par Negative Neer's impingement sign\par Negative Hawkin's test\par Positive Jessi's test\par Good strength with shoulder ER and IR.\par \par No tenderness at bicipital groove\par Negative Speed's test\par Negative Yergson's test\par \par Negative Cross-body Adduction\par Negative Robeson's test\par Negative Jerk test\par Negative Sabrina test\par \par Sensation intact to light touch axillary, medial and lateral antebrachial cutaneous, median, radial and ulnar distributions\par +motor deltoid, biceps, triceps, EPL, FDP and IO\par palpable radial pulse, WWP distally\par \par MSK:\par Lumbar spine:\par Mild tenderness to palpation.  No step-off, no deformity.\par \par NEURO EXAM:\par Sensation \par Left L2  -  2/2            \par Left L3  -  2/2\par Left L4  -  2/2\par Left L5  -  2/2\par Left S1  -  2/2\par \par Right L2  -  2/2            \par Right L3  -  2/2\par Right L4  -  2/2\par Right L5  -  2/2\par Right S1  -  2/2\par \par Motor: \par Left L2 (hip flexion)                            5/5                \par Left L3 (knee extension)                   5/5                \par Left L4 (ankle dorsiflexion)                 5/5                \par Left L5 (long toe extensor)                5/5                \par Left S1 (ankle plantar flexion)           5/5\par \par Right L2 (hip flexion)                            5/5                \par Right L3 (knee extension)                   5/5                \par Right L4 (ankle dorsiflexion)                 5/5                \par Right L5 (long toe extensor)                5/5                \par Right S1 (ankle plantar flexion)           5/5\par \par Reflexes: Normal and symmetric\par Negative clonus.  Down-going Babinski.   [de-identified] : Right AP and lateral shoulder obtained in the office today shows no fracture or dislocation. \par \par Left AP and lateral shoulder obtained in the office today shows no fracture or dislocation. \par \par Lumbar AP and lateral radiographs obtained in the office today shows no fracture or dislocation.  \par

## 2020-06-28 NOTE — ASSESSMENT
[FreeTextEntry1] : 29 year old male epileptic with recent seizures presents with low back pain and bilateral shoulder pain.  He denies any history of dislocation or disability. His exam is consistent with rotator cuff tendinitis.  He has low back pain but has no radicular symptoms and is neurologically intact. He was given a prescription for diclofenac.  He was given a referral for physical therapy.  He will followup in 6 weeks.  He knows to call with any questions or concerns or if his symptoms acutely worsen.

## 2020-07-16 RX ORDER — LEVETIRACETAM 750 MG/1
750 TABLET, FILM COATED ORAL
Qty: 60 | Refills: 5 | Status: DISCONTINUED | COMMUNITY
Start: 2020-05-06 | End: 2020-07-16

## 2020-07-16 RX ORDER — LEVETIRACETAM 750 MG/1
750 TABLET, FILM COATED ORAL TWICE DAILY
Qty: 60 | Refills: 5 | Status: DISCONTINUED | COMMUNITY
Start: 1900-01-01 | End: 2020-07-16

## 2020-07-22 ENCOUNTER — APPOINTMENT (OUTPATIENT)
Dept: NEUROLOGY | Facility: CLINIC | Age: 30
End: 2020-07-22

## 2020-08-27 ENCOUNTER — APPOINTMENT (OUTPATIENT)
Dept: ORTHOPEDIC SURGERY | Facility: CLINIC | Age: 30
End: 2020-08-27

## 2020-10-21 ENCOUNTER — APPOINTMENT (OUTPATIENT)
Dept: NEUROLOGY | Facility: CLINIC | Age: 30
End: 2020-10-21
Payer: MEDICAID

## 2020-10-21 VITALS
TEMPERATURE: 98.4 F | BODY MASS INDEX: 25.18 KG/M2 | DIASTOLIC BLOOD PRESSURE: 79 MMHG | SYSTOLIC BLOOD PRESSURE: 127 MMHG | HEART RATE: 49 BPM | OXYGEN SATURATION: 97 % | HEIGHT: 69 IN | WEIGHT: 170 LBS

## 2020-10-21 DIAGNOSIS — F41.9 ANXIETY DISORDER, UNSPECIFIED: ICD-10-CM

## 2020-10-21 DIAGNOSIS — F32.9 ANXIETY DISORDER, UNSPECIFIED: ICD-10-CM

## 2020-10-21 PROCEDURE — 99213 OFFICE O/P EST LOW 20 MIN: CPT

## 2020-10-21 PROCEDURE — 99072 ADDL SUPL MATRL&STAF TM PHE: CPT

## 2020-10-21 NOTE — ASSESSMENT
[FreeTextEntry1] : Generalized epilepsy, controlled.\par \par Continue Keppra ER 1000 mg BID\par Driving restrictions reviewed\par \par RTC 4 months

## 2020-10-21 NOTE — HISTORY OF PRESENT ILLNESS
[FreeTextEntry1] : Seizure free since last visit.  Last seizure June 2019.  Remains on Keppra ER 1000 mg BID.  No side effects noted; denies anxiety, irritability, depression, mood changes.  Not driving.

## 2020-10-21 NOTE — DATA REVIEWED
[de-identified] : MRI January 2020 -- scattered non-specific areas of encephalomalacia and hyperintensity [de-identified] : EEG January 2020 -- 3 Hz delta bursts with embedded spikes during HV

## 2020-10-21 NOTE — PHYSICAL EXAM
[FreeTextEntry1] : Physical Exam\par Constitutional: no apparent distress\par Psychiatric: normal affect, euthymic, alert and oriented x 3\par \par Neurologic Exam:\par Mental Status: awake and alert, speech fluent and prosodic with no paraphasic errors\par Cranial Nerves: pupils equal, tracks in all directions, face symmetric, no dysarthria\par Motor: normal bulk and tone, no orbiting or pronator drift, no abnormal movements\par Sensation: intact to light touch in distal upper and lower extremities bilaterally\par Coordination: finger-nose-finger intact bilaterally\par Reflexes: 2+ biceps, triceps, brachioradialis, patella\par Gait: narrow base, normal stance and stride, normal arm swing, normal tandem

## 2021-02-08 ENCOUNTER — APPOINTMENT (OUTPATIENT)
Dept: NEUROLOGY | Facility: CLINIC | Age: 31
End: 2021-02-08
Payer: MEDICAID

## 2021-02-08 VITALS — SYSTOLIC BLOOD PRESSURE: 130 MMHG | DIASTOLIC BLOOD PRESSURE: 73 MMHG

## 2021-02-08 VITALS — HEART RATE: 39 BPM | TEMPERATURE: 97.2 F | OXYGEN SATURATION: 99 %

## 2021-02-08 DIAGNOSIS — Z87.39 PERSONAL HISTORY OF OTHER DISEASES OF THE MUSCULOSKELETAL SYSTEM AND CONNECTIVE TISSUE: ICD-10-CM

## 2021-02-08 DIAGNOSIS — M25.512 PAIN IN LEFT SHOULDER: ICD-10-CM

## 2021-02-08 DIAGNOSIS — M25.511 PAIN IN RIGHT SHOULDER: ICD-10-CM

## 2021-02-08 PROCEDURE — 99072 ADDL SUPL MATRL&STAF TM PHE: CPT

## 2021-02-08 PROCEDURE — 99213 OFFICE O/P EST LOW 20 MIN: CPT

## 2021-02-08 NOTE — ASSESSMENT
[FreeTextEntry1] : Primary generalized epilepsy, controlled x 6 months\par \par -Continue Keppra ER 1g BID\par -Keppra level today\par -Seizure precautions and driving restrictions reviewed\par -Advised patient that when traveling to [place] or elsewhere with major time difference, he should take 1 extra Keppra pill upon arrival\par -Advised patient that sleep deprivation is a major risk factor for seizures and that this should be avoided whenever possible, and is likely his biggest risk factor when training for very long runs as long he takes his medications and stays well fed and hydrated (making sure to get adequate electrolytes to avoid hyponatremia)\par \par I spent 25 minutes on this encounter, primarily on counseling as described above.\par \par RTC 6 months

## 2021-02-08 NOTE — HISTORY OF PRESENT ILLNESS
[FreeTextEntry1] : 2 most recent seizures were in early May 2020 and July 19, 2020 both in the setting of missed medications.  Keppra was increased from 750 to 1000 after May 2020 seizure, then switched from 1000 regular to 1000 ER after the second seizure.  (Of note at last visit in October 2020 I was unaware of these seizures -- he had told Dr. Wilhelm prior to her departure, but no documentation in chart and he did not mention them as he thought I was already aware).\par \par No seizures since increased Keppra dose to 1000 ER BID.  Would like to know about any precautions he should take when traveling to [facility] or when training for very long runs (100K, 100 miles, etc which may take up to 24 hours).

## 2021-02-08 NOTE — PHYSICAL EXAM
[FreeTextEntry1] : General: no apparent distress\par Mental Status: awake and alert, speech fluent and prosodic with no paraphasic errors\par Cranial Nerves: tracks in all directions, face symmetric, no dysarthria\par Gait: narrow base, normal stance and stride

## 2021-02-08 NOTE — DATA REVIEWED
[de-identified] : MRI brain 1/8/2020 small foci of encephalomalacia in right frontotemporal region [de-identified] : EEG 1/2/2020 3 Hz spike and wave [de-identified] : Keppra level 1/9/2020 23.6

## 2021-02-11 ENCOUNTER — TRANSCRIPTION ENCOUNTER (OUTPATIENT)
Age: 31
End: 2021-02-11

## 2021-02-11 LAB — LEVETIRACETAM SERPL-MCNC: 21.7 UG/ML

## 2021-03-09 ENCOUNTER — TRANSCRIPTION ENCOUNTER (OUTPATIENT)
Age: 31
End: 2021-03-09

## 2021-03-15 ENCOUNTER — TRANSCRIPTION ENCOUNTER (OUTPATIENT)
Age: 31
End: 2021-03-15

## 2021-03-23 ENCOUNTER — TRANSCRIPTION ENCOUNTER (OUTPATIENT)
Age: 31
End: 2021-03-23

## 2021-07-29 ENCOUNTER — APPOINTMENT (OUTPATIENT)
Dept: NEUROLOGY | Facility: CLINIC | Age: 31
End: 2021-07-29
Payer: MEDICAID

## 2021-07-29 VITALS
DIASTOLIC BLOOD PRESSURE: 75 MMHG | OXYGEN SATURATION: 96 % | SYSTOLIC BLOOD PRESSURE: 122 MMHG | WEIGHT: 151 LBS | HEART RATE: 76 BPM | BODY MASS INDEX: 22.3 KG/M2 | TEMPERATURE: 98.2 F

## 2021-07-29 DIAGNOSIS — Z86.69 PERSONAL HISTORY OF OTHER DISEASES OF THE NERVOUS SYSTEM AND SENSE ORGANS: ICD-10-CM

## 2021-07-29 PROCEDURE — 99213 OFFICE O/P EST LOW 20 MIN: CPT

## 2021-07-29 RX ORDER — DICLOFENAC SODIUM 75 MG/1
75 TABLET, DELAYED RELEASE ORAL
Qty: 60 | Refills: 0 | Status: DISCONTINUED | COMMUNITY
Start: 2020-06-23 | End: 2021-07-29

## 2021-07-29 NOTE — DATA REVIEWED
[de-identified] : MRI brain 1/8/2020 -- small foci of encephalomalacic changes and gliosis involving the right frontal/parietal/temporal cortex [de-identified] : EEG 1/2/2020 -- generalized spike-wave discharges

## 2021-07-29 NOTE — ASSESSMENT
[FreeTextEntry1] : Primary generalized epilepsy, well-controlled\par \par Continue Keppra XR 1g BID\par Driving restrictions reviewed -- he has been seizure-free for over 1 year and is now legally able to drive in [place]\par Encouraged him to minimize sleep deprivation as able when running long races, notify teammates/race organizers about his diagnosis, and take an extra dose of Keppra if he will be awake for more than 24 hours\par \par RTC 1 year

## 2021-12-13 ENCOUNTER — EMERGENCY (EMERGENCY)
Facility: HOSPITAL | Age: 31
LOS: 1 days | Discharge: HOME | End: 2021-12-13
Attending: EMERGENCY MEDICINE | Admitting: EMERGENCY MEDICINE
Payer: COMMERCIAL

## 2021-12-13 VITALS
SYSTOLIC BLOOD PRESSURE: 131 MMHG | RESPIRATION RATE: 17 BRPM | OXYGEN SATURATION: 97 % | TEMPERATURE: 98 F | DIASTOLIC BLOOD PRESSURE: 81 MMHG | HEART RATE: 51 BPM

## 2021-12-13 VITALS
TEMPERATURE: 98 F | SYSTOLIC BLOOD PRESSURE: 141 MMHG | RESPIRATION RATE: 16 BRPM | DIASTOLIC BLOOD PRESSURE: 82 MMHG | HEIGHT: 71 IN | HEART RATE: 79 BPM | WEIGHT: 160.06 LBS | OXYGEN SATURATION: 97 %

## 2021-12-13 DIAGNOSIS — M25.511 PAIN IN RIGHT SHOULDER: ICD-10-CM
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
## 2021-12-13 DIAGNOSIS — R56.9 UNSPECIFIED CONVULSIONS: ICD-10-CM

## 2021-12-13 DIAGNOSIS — Z88.6 ALLERGY STATUS TO ANALGESIC AGENT: ICD-10-CM

## 2021-12-13 DIAGNOSIS — R51.9 HEADACHE, UNSPECIFIED: ICD-10-CM

## 2021-12-13 DIAGNOSIS — G40.909 EPILEPSY, UNSPECIFIED, NOT INTRACTABLE, WITHOUT STATUS EPILEPTICUS: ICD-10-CM

## 2021-12-13 DIAGNOSIS — N39.0 URINARY TRACT INFECTION, SITE NOT SPECIFIED: ICD-10-CM
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
## 2021-12-13 LAB
ALBUMIN SERPL ELPH-MCNC: 4.5 G/DL — SIGNIFICANT CHANGE UP (ref 3.3–5)
ALP SERPL-CCNC: 59 U/L — SIGNIFICANT CHANGE UP (ref 40–120)
ALT FLD-CCNC: 21 U/L — SIGNIFICANT CHANGE UP (ref 10–45)
ANION GAP SERPL CALC-SCNC: 7 MMOL/L — SIGNIFICANT CHANGE UP (ref 5–17)
APPEARANCE UR: CLEAR — SIGNIFICANT CHANGE UP
AST SERPL-CCNC: 27 U/L — SIGNIFICANT CHANGE UP (ref 10–40)
BACTERIA # UR AUTO: PRESENT /HPF
BASOPHILS # BLD AUTO: 0.04 K/UL — SIGNIFICANT CHANGE UP (ref 0–0.2)
BASOPHILS NFR BLD AUTO: 0.9 % — SIGNIFICANT CHANGE UP (ref 0–2)
BILIRUB SERPL-MCNC: 0.2 MG/DL — SIGNIFICANT CHANGE UP (ref 0.2–1.2)
BILIRUB UR-MCNC: NEGATIVE — SIGNIFICANT CHANGE UP
BUN SERPL-MCNC: 16 MG/DL — SIGNIFICANT CHANGE UP (ref 7–23)
CALCIUM SERPL-MCNC: 9.4 MG/DL — SIGNIFICANT CHANGE UP (ref 8.4–10.5)
CHLORIDE SERPL-SCNC: 101 MMOL/L — SIGNIFICANT CHANGE UP (ref 96–108)
CO2 SERPL-SCNC: 30 MMOL/L — SIGNIFICANT CHANGE UP (ref 22–31)
COLOR SPEC: YELLOW — SIGNIFICANT CHANGE UP
CREAT SERPL-MCNC: 0.89 MG/DL — SIGNIFICANT CHANGE UP (ref 0.5–1.3)
DIFF PNL FLD: NEGATIVE — SIGNIFICANT CHANGE UP
EOSINOPHIL # BLD AUTO: 0.14 K/UL — SIGNIFICANT CHANGE UP (ref 0–0.5)
EOSINOPHIL NFR BLD AUTO: 3.1 % — SIGNIFICANT CHANGE UP (ref 0–6)
EPI CELLS # UR: SIGNIFICANT CHANGE UP /HPF (ref 0–5)
GLUCOSE SERPL-MCNC: 98 MG/DL — SIGNIFICANT CHANGE UP (ref 70–99)
GLUCOSE UR QL: NEGATIVE — SIGNIFICANT CHANGE UP
HCT VFR BLD CALC: 38.8 % — LOW (ref 39–50)
HGB BLD-MCNC: 13 G/DL — SIGNIFICANT CHANGE UP (ref 13–17)
IMM GRANULOCYTES NFR BLD AUTO: 0.2 % — SIGNIFICANT CHANGE UP (ref 0–1.5)
KETONES UR-MCNC: NEGATIVE — SIGNIFICANT CHANGE UP
LEUKOCYTE ESTERASE UR-ACNC: ABNORMAL
LYMPHOCYTES # BLD AUTO: 1.03 K/UL — SIGNIFICANT CHANGE UP (ref 1–3.3)
LYMPHOCYTES # BLD AUTO: 22.5 % — SIGNIFICANT CHANGE UP (ref 13–44)
MCHC RBC-ENTMCNC: 30.3 PG — SIGNIFICANT CHANGE UP (ref 27–34)
MCHC RBC-ENTMCNC: 33.5 GM/DL — SIGNIFICANT CHANGE UP (ref 32–36)
MCV RBC AUTO: 90.4 FL — SIGNIFICANT CHANGE UP (ref 80–100)
MONOCYTES # BLD AUTO: 0.35 K/UL — SIGNIFICANT CHANGE UP (ref 0–0.9)
MONOCYTES NFR BLD AUTO: 7.6 % — SIGNIFICANT CHANGE UP (ref 2–14)
NEUTROPHILS # BLD AUTO: 3.01 K/UL — SIGNIFICANT CHANGE UP (ref 1.8–7.4)
NEUTROPHILS NFR BLD AUTO: 65.7 % — SIGNIFICANT CHANGE UP (ref 43–77)
NITRITE UR-MCNC: NEGATIVE — SIGNIFICANT CHANGE UP
NRBC # BLD: 0 /100 WBCS — SIGNIFICANT CHANGE UP (ref 0–0)
PH UR: 6 — SIGNIFICANT CHANGE UP (ref 5–8)
PLATELET # BLD AUTO: 193 K/UL — SIGNIFICANT CHANGE UP (ref 150–400)
POTASSIUM SERPL-MCNC: 4.4 MMOL/L — SIGNIFICANT CHANGE UP (ref 3.5–5.3)
POTASSIUM SERPL-SCNC: 4.4 MMOL/L — SIGNIFICANT CHANGE UP (ref 3.5–5.3)
PROT SERPL-MCNC: 6.8 G/DL — SIGNIFICANT CHANGE UP (ref 6–8.3)
PROT UR-MCNC: NEGATIVE MG/DL — SIGNIFICANT CHANGE UP
RBC # BLD: 4.29 M/UL — SIGNIFICANT CHANGE UP (ref 4.2–5.8)
RBC # FLD: 11.8 % — SIGNIFICANT CHANGE UP (ref 10.3–14.5)
RBC CASTS # UR COMP ASSIST: < 5 /HPF — SIGNIFICANT CHANGE UP
SODIUM SERPL-SCNC: 138 MMOL/L — SIGNIFICANT CHANGE UP (ref 135–145)
SP GR SPEC: 1.02 — SIGNIFICANT CHANGE UP (ref 1–1.03)
UROBILINOGEN FLD QL: 0.2 E.U./DL — SIGNIFICANT CHANGE UP
WBC # BLD: 4.58 K/UL — SIGNIFICANT CHANGE UP (ref 3.8–10.5)
WBC # FLD AUTO: 4.58 K/UL — SIGNIFICANT CHANGE UP (ref 3.8–10.5)
WBC UR QL: > 10 /HPF

## 2021-12-13 PROCEDURE — 99284 EMERGENCY DEPT VISIT MOD MDM: CPT

## 2021-12-13 PROCEDURE — 85025 COMPLETE CBC W/AUTO DIFF WBC: CPT

## 2021-12-13 PROCEDURE — 80053 COMPREHEN METABOLIC PANEL: CPT

## 2021-12-13 PROCEDURE — 81001 URINALYSIS AUTO W/SCOPE: CPT

## 2021-12-13 PROCEDURE — 36415 COLL VENOUS BLD VENIPUNCTURE: CPT

## 2021-12-13 PROCEDURE — 82962 GLUCOSE BLOOD TEST: CPT

## 2021-12-13 PROCEDURE — 99283 EMERGENCY DEPT VISIT LOW MDM: CPT

## 2021-12-13 RX ORDER — CEPHALEXIN 500 MG
1 CAPSULE ORAL
Qty: 14 | Refills: 0
Start: 2021-12-13 | End: 2021-12-19

## 2021-12-13 NOTE — ED PROVIDER NOTE - CARE PLAN
Principal Discharge DX:	Seizure   1 Principal Discharge DX:	Seizure  Secondary Diagnosis:	Acute UTI

## 2021-12-13 NOTE — ED PROVIDER NOTE - PATIENT PORTAL LINK FT
You can access the FollowMyHealth Patient Portal offered by [facility] by registering at the following website: [email or web address]. By joining [employer]’s FollowMyHealth portal, you will also be able to view your health information using other applications (apps) compatible with our system.

## 2021-12-13 NOTE — ED PROVIDER NOTE - ATTENDING CONTRIBUTION TO CARE
31M PMH epilepsy p/w concern for seizure.  Per triage note "Pt was walking his dog this morning, had a witnessed seizure by bystander. Has hx of seizures, did not take meds today. Pt c/o right shoulder pain, denies headache, n/v, dizziness, weakness. Pt A&Ox3 at this time."  Per pt: He remembers feeling like usual self, walking his dog then waking up in ambulance. Missed keppra last night. Currently has minimal HA that is improving, similar to HA after prior seizures. Also c/o minimal R shoulder pain where he thinks he must have fallen. Mild dysuria. No other systemic symptoms. neuro Dr. Dangelo. Vitals wnl, exam as above. Very well appearing.  ddx: Likely seizure, likely 2/2 medication non-adherence. Clinically no significant trauma. Possible UTI.  Labs, UA, reassess.

## 2021-12-13 NOTE — ED ADULT NURSE NOTE - NSIMPLEMENTINTERV_GEN_ALL_ED
Implemented All Universal Safety Interventions:  [place] to call system. Call bell, personal items and telephone within reach. Instruct patient to call for assistance. Room bathroom lighting operational. Non-slip footwear when patient is off stretcher. Physically safe environment: no spills, clutter or unnecessary equipment. Stretcher in lowest position, wheels locked, appropriate side rails in place.

## 2021-12-13 NOTE — ED PROVIDER NOTE - NSFOLLOWUPINSTRUCTIONS_ED_ALL_ED_FT
Can take tylenol 650mg or motrin 600mg (May cause stomach irritation - take with food and avoid prolonged use) every 6hrs as needed for pain.    Take seizure medications as prescribed.    Stay well hydrated.    Return for fevers, persistent vomit, uncontrolled pain, worsening breathing, worsening lightheaded.  Follow up with primary doctor within 1-2 days.     Follow up with your neurologist Dr. Lujan!!   DO NOT perform any activities during which if you have another seizure it can be dangerous until cleared by your neurologist (i.e. driving, operating machinery).    Seizure    A seizure is abnormal electrical activity in the brain; the specific cause may or may not be found. Prior to a seizure you may experience a warning sensation (aura) that may include fear, nausea, dizziness, and visual changes such as flashing lights of spots. Common symptoms during the seizure may include an altered mental status, rhythmic jerking movements, drooling, grunting, loss of bladder or bowel control, or tongue biting. After a seizure, you may feel confused and sleepy.     Do not swim, drive, operate machinery, or engage in any risky activity during which a seizure could cause further injury to you or others. Teach friends and family what to do if you HAVE a seizure which includes laying you on the ground with your head on a cushion and turning you to the side to keep your breathing passages clear in case of vomiting.    SEEK IMMEDIATE MEDICAL CARE IF YOU HAVE ANY OF THE FOLLOWING SYMPTOMS: seizure lasting over 5 minutes, not waking up or persistent altered mental status after the seizure, or more frequent or worsening seizures. Can take tylenol 650mg or motrin 600mg (May cause stomach irritation - take with food and avoid prolonged use) every 6hrs as needed for pain.    Take seizure medications as prescribed.    Take antibiotics as prescribed.     Stay well hydrated.    Return for fevers, persistent vomit, uncontrolled pain, worsening breathing, worsening lightheaded.  Follow up with primary doctor within 1-2 days.     Follow up with your neurologist Dr. Lujan!!   DO NOT perform any activities during which if you have another seizure it can be dangerous until cleared by your neurologist (i.e. driving, operating machinery).      Urinary Tract Infection    A urinary tract infection (UTI) is an infection of any part of the urinary tract, which includes the kidneys, ureters, bladder, and urethra. Risk factors include ignoring your need to urinate, wiping back to front if female, being an uncircumcised male, and having diabetes or a weak immune system. Symptoms include frequent urination, pain or burning with urination, foul smelling urine, cloudy urine, pain in the lower abdomen, blood in the urine, and fever. If you were prescribed an antibiotic medicine, take it as told by your health care provider. Do not stop taking the antibiotic even if you start to feel better.    SEEK IMMEDIATE MEDICAL CARE IF YOU HAVE ANY OF THE FOLLOWING SYMPTOMS: severe back or abdominal pain, fever, inability to keep fluids or medicine down, dizziness/lightheadedness, or a change in mental status.     Seizure    A seizure is abnormal electrical activity in the brain; the specific cause may or may not be found. Prior to a seizure you may experience a warning sensation (aura) that may include fear, nausea, dizziness, and visual changes such as flashing lights of spots. Common symptoms during the seizure may include an altered mental status, rhythmic jerking movements, drooling, grunting, loss of bladder or bowel control, or tongue biting. After a seizure, you may feel confused and sleepy.     Do not swim, drive, operate machinery, or engage in any risky activity during which a seizure could cause further injury to you or others. Teach friends and family what to do if you HAVE a seizure which includes laying you on the ground with your head on a cushion and turning you to the side to keep your breathing passages clear in case of vomiting.    SEEK IMMEDIATE MEDICAL CARE IF YOU HAVE ANY OF THE FOLLOWING SYMPTOMS: seizure lasting over 5 minutes, not waking up or persistent altered mental status after the seizure, or more frequent or worsening seizures.

## 2021-12-13 NOTE — ED PROVIDER NOTE - OBJECTIVE STATEMENT
This is a 31y M with PMH of epilepsy currently tx with levetiracetam who was BIBEMS for reported seizure-like activity. Pt states he normally takes levetiracetam 1000mg BID, but missed his dose last night before bed. When he woke up this morning he took his usual morning dose at 745am. He then went out to walk his dog, and the next thing he remembers is waking up in the ambulance. He does not think he struck his head when he fell. He is currently experiencing headache and right shoulder pain. He normally has some post-ictal confusion that lasts for a few minutes, but has never had tongue biting or incontinence, weakness or paresthesias. He does not usually have any issues with adhering to his medication schedule. He reports discomfort with urination for the past week. He denies any fevers, chills, cough, sob, n/v/d/c. Denies any recent missed meals, illicit drug or alcohol use, or lack of sleep. This is a 31y M with PMH of epilepsy currently tx with levetiracetam who was BIBEMS for reported seizure-like activity. Pt states he normally takes levetiracetam 1000mg BID, but missed his dose last night before bed. When he woke up this morning he took his usual morning dose at 745am. He then went out to walk his dog, and the next thing he remembers is waking up in the ambulance. He does not think he struck his head when he fell. He is currently experiencing headache and right shoulder pain. He normally has some post-ictal confusion that lasts for a few minutes, but has never had tongue biting or incontinence, weakness or paresthesias. He does not usually have any issues with adhering to his medication schedule. He reports discomfort with urination for the past week. He denies any fevers, chills, cough, sob, n/v/d/c. Denies any drug or alcohol use, denies lack of sleep.

## 2021-12-13 NOTE — ED PROVIDER NOTE - PHYSICAL EXAMINATION
no LE edema, normal equal distal pulses, steady unassisted gait.   No spinal ttp, neck FROM. Strength 5/5. No bony ttp, FROM all extremities. Normal equal distal pulses. Steady unassisted gait.

## 2021-12-13 NOTE — ED PROVIDER NOTE - NEUROLOGICAL, MLM
Alert and oriented, no focal deficits, no motor or sensory deficits. CN2-12 intact. Strength 5/5 upper and lower extremities b/l. Sensation intact.

## 2021-12-13 NOTE — ED PROVIDER NOTE - ENMT, MLM
Mouth with normal mucosa. No evidence of lacerations on tongue/lips. Throat has no vesicles, no oropharyngeal exudates and uvula is midline.

## 2021-12-13 NOTE — ED ADULT NURSE NOTE - OBJECTIVE STATEMENT
The patient is a 31y Male BIBA for seizures. Pt reports was walking his dog down the street and had a tonic-clonic seizure witnessed by bystanders. Denies hitting his head, report some shoulder pain. no obvious deformities noted. Denies headache, CP, dizziness, N/V/D.

## 2021-12-13 NOTE — ED ADULT TRIAGE NOTE - CHIEF COMPLAINT QUOTE
Pt was walking his dog this morning, had a witnessed seizure by bystander. Has hx of seizures, did not take meds today. Pt c/o right shoulder pain, denies headache, n/v, dizziness, weakness. Pt A&Ox3 at this time. FS by .

## 2021-12-13 NOTE — ED PROVIDER NOTE - PROGRESS NOTE DETAILS
Klepfish: labs grossly wnl. UA pending. Updated dr. last, agreeable w/ outpt f/u. Will reassess. Klepfish: UA w/ e/o infeciton. Given urinary symptoms, will tx for UTI. Pt otherwise asymptomatic. Discussed importance of outpt follow up and return precautions. Clinically no indication for further emergent ED workup or hospitalization at this time. Comfortable for dc, outpt f/u.

## 2021-12-20 ENCOUNTER — NON-APPOINTMENT (OUTPATIENT)
Age: 31
End: 2021-12-20

## 2022-03-14 ENCOUNTER — RX RENEWAL (OUTPATIENT)
Age: 32
End: 2022-03-14

## 2022-04-13 ENCOUNTER — NON-APPOINTMENT (OUTPATIENT)
Age: 32
End: 2022-04-13

## 2022-07-21 ENCOUNTER — EMERGENCY (EMERGENCY)
Facility: HOSPITAL | Age: 32
LOS: 1 days | Discharge: HOME | End: 2022-07-21
Attending: EMERGENCY MEDICINE | Admitting: EMERGENCY MEDICINE
Payer: COMMERCIAL

## 2022-07-21 VITALS
DIASTOLIC BLOOD PRESSURE: 80 MMHG | OXYGEN SATURATION: 95 % | WEIGHT: 181 LBS | HEART RATE: 75 BPM | TEMPERATURE: 98 F | SYSTOLIC BLOOD PRESSURE: 126 MMHG | HEIGHT: 71 IN | RESPIRATION RATE: 16 BRPM

## 2022-07-21 VITALS
SYSTOLIC BLOOD PRESSURE: 122 MMHG | RESPIRATION RATE: 16 BRPM | OXYGEN SATURATION: 98 % | DIASTOLIC BLOOD PRESSURE: 78 MMHG | TEMPERATURE: 98 F | HEART RATE: 48 BPM
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
## 2022-07-21 DIAGNOSIS — S50.311A ABRASION OF RIGHT ELBOW, INITIAL ENCOUNTER: ICD-10-CM

## 2022-07-21 DIAGNOSIS — Z88.6 ALLERGY STATUS TO ANALGESIC AGENT: ICD-10-CM
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
## 2022-07-21 DIAGNOSIS — I49.49 OTHER PREMATURE DEPOLARIZATION: ICD-10-CM

## 2022-07-21 DIAGNOSIS — M25.511 PAIN IN RIGHT SHOULDER: ICD-10-CM

## 2022-07-21 DIAGNOSIS — S60.512A ABRASION OF LEFT HAND, INITIAL ENCOUNTER: ICD-10-CM
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
## 2022-07-21 DIAGNOSIS — S20.211A CONTUSION OF RIGHT FRONT WALL OF THORAX, INITIAL ENCOUNTER: ICD-10-CM

## 2022-07-21 DIAGNOSIS — G40.909 EPILEPSY, UNSPECIFIED, NOT INTRACTABLE, WITHOUT STATUS EPILEPTICUS: ICD-10-CM

## 2022-07-21 DIAGNOSIS — S80.212A ABRASION, LEFT KNEE, INITIAL ENCOUNTER: ICD-10-CM

## 2022-07-21 DIAGNOSIS — R00.1 BRADYCARDIA, UNSPECIFIED: ICD-10-CM

## 2022-07-21 LAB
ALBUMIN SERPL ELPH-MCNC: 4.9 G/DL — SIGNIFICANT CHANGE UP (ref 3.3–5)
ALP SERPL-CCNC: 58 U/L — SIGNIFICANT CHANGE UP (ref 40–120)
ALT FLD-CCNC: 27 U/L — SIGNIFICANT CHANGE UP (ref 10–45)
ANION GAP SERPL CALC-SCNC: 11 MMOL/L — SIGNIFICANT CHANGE UP (ref 5–17)
AST SERPL-CCNC: 31 U/L — SIGNIFICANT CHANGE UP (ref 10–40)
BILIRUB SERPL-MCNC: 0.3 MG/DL — SIGNIFICANT CHANGE UP (ref 0.2–1.2)
BUN SERPL-MCNC: 18 MG/DL — SIGNIFICANT CHANGE UP (ref 7–23)
CALCIUM SERPL-MCNC: 10.2 MG/DL — SIGNIFICANT CHANGE UP (ref 8.4–10.5)
CHLORIDE SERPL-SCNC: 102 MMOL/L — SIGNIFICANT CHANGE UP (ref 96–108)
CO2 SERPL-SCNC: 28 MMOL/L — SIGNIFICANT CHANGE UP (ref 22–31)
CREAT SERPL-MCNC: 0.97 MG/DL — SIGNIFICANT CHANGE UP (ref 0.5–1.3)
EGFR: 107 ML/MIN/1.73M2 — SIGNIFICANT CHANGE UP
GLUCOSE SERPL-MCNC: 103 MG/DL — HIGH (ref 70–99)
HCT VFR BLD CALC: 38.5 % — LOW (ref 39–50)
HGB BLD-MCNC: 13.2 G/DL — SIGNIFICANT CHANGE UP (ref 13–17)
LACTATE SERPL-SCNC: 1.6 MMOL/L — SIGNIFICANT CHANGE UP (ref 0.5–2)
MCHC RBC-ENTMCNC: 29.9 PG — SIGNIFICANT CHANGE UP (ref 27–34)
MCHC RBC-ENTMCNC: 34.3 GM/DL — SIGNIFICANT CHANGE UP (ref 32–36)
MCV RBC AUTO: 87.1 FL — SIGNIFICANT CHANGE UP (ref 80–100)
NRBC # BLD: 0 /100 WBCS — SIGNIFICANT CHANGE UP (ref 0–0)
PLATELET # BLD AUTO: 188 K/UL — SIGNIFICANT CHANGE UP (ref 150–400)
POTASSIUM SERPL-MCNC: 4.5 MMOL/L — SIGNIFICANT CHANGE UP (ref 3.5–5.3)
POTASSIUM SERPL-SCNC: 4.5 MMOL/L — SIGNIFICANT CHANGE UP (ref 3.5–5.3)
PROT SERPL-MCNC: 7.3 G/DL — SIGNIFICANT CHANGE UP (ref 6–8.3)
RBC # BLD: 4.42 M/UL — SIGNIFICANT CHANGE UP (ref 4.2–5.8)
RBC # FLD: 11.9 % — SIGNIFICANT CHANGE UP (ref 10.3–14.5)
SODIUM SERPL-SCNC: 141 MMOL/L — SIGNIFICANT CHANGE UP (ref 135–145)
WBC # BLD: 3.9 K/UL — SIGNIFICANT CHANGE UP (ref 3.8–10.5)
WBC # FLD AUTO: 3.9 K/UL — SIGNIFICANT CHANGE UP (ref 3.8–10.5)

## 2022-07-21 PROCEDURE — 93005 ELECTROCARDIOGRAM TRACING: CPT

## 2022-07-21 PROCEDURE — 71101 X-RAY EXAM UNILAT RIBS/CHEST: CPT | Mod: 26,RT

## 2022-07-21 PROCEDURE — 76705 ECHO EXAM OF ABDOMEN: CPT

## 2022-07-21 PROCEDURE — 82962 GLUCOSE BLOOD TEST: CPT

## 2022-07-21 PROCEDURE — 93010 ELECTROCARDIOGRAM REPORT: CPT

## 2022-07-21 PROCEDURE — 83605 ASSAY OF LACTIC ACID: CPT

## 2022-07-21 PROCEDURE — 73030 X-RAY EXAM OF SHOULDER: CPT

## 2022-07-21 PROCEDURE — 36415 COLL VENOUS BLD VENIPUNCTURE: CPT

## 2022-07-21 PROCEDURE — 80053 COMPREHEN METABOLIC PANEL: CPT

## 2022-07-21 PROCEDURE — 80177 DRUG SCRN QUAN LEVETIRACETAM: CPT

## 2022-07-21 PROCEDURE — 71101 X-RAY EXAM UNILAT RIBS/CHEST: CPT

## 2022-07-21 PROCEDURE — 85027 COMPLETE CBC AUTOMATED: CPT

## 2022-07-21 PROCEDURE — 73030 X-RAY EXAM OF SHOULDER: CPT | Mod: 26,RT

## 2022-07-21 PROCEDURE — 76705 ECHO EXAM OF ABDOMEN: CPT | Mod: 26

## 2022-07-21 PROCEDURE — 99284 EMERGENCY DEPT VISIT MOD MDM: CPT | Mod: 25

## 2022-07-21 PROCEDURE — 99285 EMERGENCY DEPT VISIT HI MDM: CPT | Mod: 25

## 2022-07-21 RX ORDER — LEVETIRACETAM 250 MG/1
1000 TABLET, FILM COATED ORAL ONCE
Refills: 0 | Status: COMPLETED | OUTPATIENT
Start: 2022-07-21 | End: 2022-07-21

## 2022-07-21 RX ORDER — LEVETIRACETAM 250 MG/1
2 TABLET, FILM COATED ORAL
Qty: 0 | Refills: 0 | DISCHARGE

## 2022-07-21 RX ORDER — BACITRACIN ZINC 500 UNIT/G
1 OINTMENT IN PACKET (EA) TOPICAL ONCE
Refills: 0 | Status: COMPLETED | OUTPATIENT
Start: 2022-07-21 | End: 2022-07-21

## 2022-07-21 RX ORDER — SODIUM CHLORIDE 9 MG/ML
1000 INJECTION INTRAMUSCULAR; INTRAVENOUS; SUBCUTANEOUS ONCE
Refills: 0 | Status: COMPLETED | OUTPATIENT
Start: 2022-07-21 | End: 2022-07-21

## 2022-07-21 RX ORDER — LEVETIRACETAM 250 MG/1
3 TABLET, FILM COATED ORAL
Qty: 84 | Refills: 0
Start: 2022-07-21 | End: 2022-08-03

## 2022-07-21 RX ADMIN — SODIUM CHLORIDE 1000 MILLILITER(S): 9 INJECTION INTRAMUSCULAR; INTRAVENOUS; SUBCUTANEOUS at 08:29

## 2022-07-21 RX ADMIN — LEVETIRACETAM 1000 MILLIGRAM(S): 250 TABLET, FILM COATED ORAL at 11:32

## 2022-07-21 RX ADMIN — Medication 1 APPLICATION(S): at 09:34

## 2022-07-21 NOTE — ED PROVIDER NOTE - CARE PROVIDER_API CALL
Mary Kate Lujan)  Neurology  [address] [place] [address]  Phone: [phone]  Fax: [phone]  Follow Up Time:

## 2022-07-21 NOTE — ED ADULT NURSE REASSESSMENT NOTE - NS ED NURSE REASSESS COMMENT FT1
XR done, wound care done & bacitracin applied to abrasion top of left hand, abrasion to back or right arm/right elbow.  Gauze dressing applied.

## 2022-07-21 NOTE — ED ADULT TRIAGE NOTE - PRO INTERPRETER NEED 2
Final Anesthesia Post-op Assessment    Patient: Moi Vigil  Procedure(s) Performed: PARS PLANA VITRECTOMY; RIGHT EYE, 25 GAUGE. - RIGHT  Anesthesia type: General    Vitals Value Taken Time   Temp 36.2 °C (97.2 °F) 05/11/22 1550   Pulse 66 05/11/22 1550   Resp 18 05/11/22 1550   SpO2 96 % 05/11/22 1550   /56 05/11/22 1550         Patient Location: PACU Phase 1  Post-op Vital Signs:stable  Level of Consciousness: awake and alert  Respiratory Status: spontaneous ventilation  Cardiovascular stable  Hydration: euvolemic  Pain Management: adequately controlled  Handoff: Handoff to receiving nurse was performed and questions were answered  Vomiting: none  Nausea: None  Airway Patency:patent  Post-op Assessment: no complications and patient tolerated procedure well with no complications      No complications documented.   
English

## 2022-07-21 NOTE — ED PROVIDER NOTE - CARE PLAN
1 Principal Discharge DX:	Seizure   Principal Discharge DX:	Seizure  Secondary Diagnosis:	Rib contusion

## 2022-07-21 NOTE — ED ADULT NURSE REASSESSMENT NOTE - NS ED NURSE REASSESS COMMENT FT1
Patient evaluated by Epilepsy team, reeval by  Director, Levetiracetam instructions provided.  All questions answered.

## 2022-07-21 NOTE — ED PROVIDER NOTE - OBJECTIVE STATEMENT
31 yo male h/o sz on keppra (reports compliance) c/o sz that occurred while running in park this am.  No head trauma, ha, change in vision/speech/gait, numbness or weakness in ext, tongue biting, incontinence.  No neck/back pain.  Pt notes pain R shoulder and R ant ribs when moving w/o associated sob, abd pain, n/v.  Pt also notes abrasions on R elbow, L hand.  Last sx 12/21; followed by Dr Lujan.  No other complaints.  Reports usoh until sz this am, nl po's, nl sleep.

## 2022-07-21 NOTE — ED PROVIDER NOTE - PATIENT PORTAL LINK FT
You can access the FollowMyHealth Patient Portal offered by [facility] by registering at the following website: [email or web address]. By joining [employer]’s FollowMyHealth portal, you will also be able to view your health information using other applications (apps) compatible with our system.

## 2022-07-21 NOTE — ED PROVIDER NOTE - NSFOLLOWUPINSTRUCTIONS_ED_ALL_ED_FT
Seizure  Rib contusion    Take ibuprofen or tylenol as needed for pain.  Use as directed.      Keep wounds clean and dry.  Use antiobiotic ointment on abrasions twice a day.  Return for increased pain, redness/swelling/drainage from wound, fever, any other concerns.     Take at least 10 deep breaths per hour.    Return for increased pain, abdominal pain, increased difficulty breathing, cough, fever, any other concerns.  You may be active as tolerated by your pain.     Increase your Keppra to 1500 mg twice a day (3 tabs twice a day).      Return for increased pain, recurrent seizure, change in behavior, change in vision/speech/gait, numbness or weakness in extremities, vomiting, any other concerns.     Follow up with your pmd and Dr Lujan.     --  Seizure    A seizure is abnormal electrical activity in the brain; the specific cause may or may not be found. Prior to a seizure you may experience a warning sensation (aura) that may include fear, nausea, dizziness, and visual changes such as flashing lights of spots. Common symptoms during the seizure may include an altered mental status, rhythmic jerking movements, drooling, grunting, loss of bladder or bowel control, or tongue biting. After a seizure, you may feel confused and sleepy.     Do not swim, drive, operate machinery, or engage in any risky activity during which a seizure could cause further injury to you or others. Teach friends and family what to do if you HAVE a seizure which includes laying you on the ground with your head on a cushion and turning you to the side to keep your breathing passages clear in case of vomiting.    SEEK IMMEDIATE MEDICAL CARE IF YOU HAVE ANY OF THE FOLLOWING SYMPTOMS: seizure lasting over 5 minutes, not waking up or persistent altered mental status after the seizure, or more frequent or worsening seizures.     --  Rib Fracture(s)/Contusion    A rib fracture is a break or crack in one of the bones of the ribs.  A rib contusion is a bruise to the ribs and surrounding tissue. The ribs are a group of long, curved bones that wrap around your chest and attach to your spine. A broken or cracked rib is often painful, but most do not cause other problems and heal on their own over time. A rib contusion is also very painful.  Symptoms include pain when you breath or cough or pain when pressing over the area. Breathing exercises will help keep your lungs inflated and prevent lung collapse or infection.    SEEK IMMEDIATE MEDICAL CARE IF YOU HAVE ANY OF THE FOLLOWING SYMPTOMS: fever, shortness of breath, coughing up blood, abdominal pain, nausea or vomiting, pain not controlled with medications.

## 2022-07-21 NOTE — ED ADULT TRIAGE NOTE - ARRIVAL INFO ADDITIONAL COMMENTS
Patient on Keppra extended release, states compliance to medication. Cooper aox4 in triage. No neuro deficits noted. Ambulates with steady gait. Denies hitting head.

## 2022-07-21 NOTE — ED ADULT NURSE NOTE - OBJECTIVE STATEMENT
Patient arrives ambulatory with EMS, reporting seizure while running.  Denies any tongue lac/incontinence.  Patient is training for a marathon (usually runs 100 mile races).  Hx seizures, takes Levetriacetam 1000mg in AM and 1000mg in PM, states he has not missed any doses.  Girlfriend present states last seizure was in December, and usually are associated with missed doses.  Patient noted to have dirt/gravel on arms/legs.  FS/STAT EKG completed.

## 2022-07-21 NOTE — ED PROVIDER NOTE - PHYSICAL EXAMINATION
VITAL SIGNS: I have reviewed nursing notes and confirm.  CONSTITUTIONAL: Well-developed; well-nourished; in no acute distress.   SKIN:  warm and dry, no acute rash.   HEAD:  normocephalic, atraumatic.  EYES: PERRL, EOM intact; conjunctiva and sclera clear.  ENT: No nasal discharge; airway clear.   NECK: Supple; non tender.  CARD: S1, S2 normal; no murmurs, gallops, or rubs. Regular rate and rhythm.   RESP:  Clear to auscultation b/l, no wheezes, rales or rhonchi.  ABD: Normal bowel sounds; soft; non-distended; non-tender; no guarding/ rebound.  MSK: Normal ROM. No clubbing, cyanosis or edema. no ttp bilat le No sig R shoulder ttp, + abrasion R elbow w/o bony ttp, abrasion L hand over 1st mcp w/o bony ttp, abrasion L knee, no LE ttp, neck and back nontender midline   NEURO: awake, alert, oriented x 3, CN II-XII grossly intact, motor 5/5, no gross sens deficits, gait steady, no ataxia, speech clear.   PSYCH: Cooperative, mood and affect appropriate.

## 2022-07-21 NOTE — CONSULT NOTE ADULT - ASSESSMENT
33 yo M with PMHx seizure disorder (Keppra) px to [facility] ED on 7/21 following episode of seizure that occurred in the AM of admission. Initial labs, fingerstick glucose, and lactate within normal limits.    -     **Incomplete Note**  **Incomplete Note**   33 yo M with PMHx seizure disorder (Keppra) px to [facility] ED on 7/21 following episode of seizure that occurred in the AM of admission. Initial labs, fingerstick glucose, and lactate within normal limits. Epilepsy consulted for breakthrough seizure.    - Can give additional dose of Keppra 1000mg PO x1 in ED  - On discharge, patient to take increased dose of Keppra 1500mg PO Q12   - Patient has follow-up appointment scheduled with Dr. Dangelo on 7/29  - Patient can be discharged from ED with close outpatient follow-up

## 2022-07-21 NOTE — ED ADULT TRIAGE NOTE - CODE STROKE ACTIVE YN
Bharath Schmitt,Just an FYI.  Please let me know if you have any questions.  Thanks,Moe 
Bharath Sin,Please let me know if you have any questions or suggestions re the plan. If you're comfortable with it, could you have your staff contact [facility] to arrange repeat EGD ~ 5/2019? Thanks!Moe
No

## 2022-07-21 NOTE — ED PROVIDER NOTE - PROGRESS NOTE DETAILS
Pt discussed w Dr Lujan - will come or have epilepsy NP come to ed to eval pt and recommend next steps.  Xrays neg on my read. Pt discussed w Dr Lujan and chastity by epilepsy - recommend increasing keppra to 1500 mg bid.  Will dc w extra keppra, to fu w Dr Lujan. Pt discussed w Dr Lujan and chastity by epilepsy - recommend increasing keppra to 1500 mg bid, 1 gm po now.  Will dc w extra keppra, to fu sydni Lujan.

## 2022-07-25 LAB — LEVETIRACETAM SERPL-MCNC: 27.6 UG/ML — SIGNIFICANT CHANGE UP (ref 10–40)

## 2022-07-29 ENCOUNTER — APPOINTMENT (OUTPATIENT)
Dept: NEUROLOGY | Facility: CLINIC | Age: 32
End: 2022-07-29

## 2022-09-27 PROBLEM — R56.9 UNSPECIFIED CONVULSIONS: Chronic | Status: ACTIVE | Noted: 2022-07-21

## 2022-09-28 ENCOUNTER — APPOINTMENT (OUTPATIENT)
Dept: NEUROLOGY | Facility: CLINIC | Age: 32
End: 2022-09-28

## 2022-09-28 ENCOUNTER — NON-APPOINTMENT (OUTPATIENT)
Age: 32
End: 2022-09-28

## 2022-09-28 VITALS
WEIGHT: 168 LBS | OXYGEN SATURATION: 97 % | TEMPERATURE: 97.9 F | SYSTOLIC BLOOD PRESSURE: 138 MMHG | HEART RATE: 42 BPM | BODY MASS INDEX: 24.88 KG/M2 | DIASTOLIC BLOOD PRESSURE: 78 MMHG | HEIGHT: 69 IN

## 2022-09-28 DIAGNOSIS — G40.909 EPILEPSY, UNSPECIFIED, NOT INTRACTABLE, W/OUT STATUS EPILEPTICUS: ICD-10-CM

## 2022-09-28 PROCEDURE — 99215 OFFICE O/P EST HI 40 MIN: CPT

## 2022-09-28 NOTE — HISTORY OF PRESENT ILLNESS
[FreeTextEntry1] : Presents with wife for follow up.\par \par 3 seizures since last visit.  First was in July while running, presented to [facility] ED, seen by Dr. Gil, Keppra increased to 1500 mg ER BID.  Did well the rest of the summer, then within the past 2 weeks has had 2 seizures.  2 Fridays ago was stressed with last-minute plans for his wedding, went for a run on Saturday morning and had a seizure.  EMS was called, he returned to baseline and did not go to ED.  Then the following Friday was the wedding, stayed up until 3:30 AM drinking, then next morning woke up around 8:30 AM and had a seizure.  Was confused afterward but managed to go to [facility] with family.  Still feeling out of it since then.  Planning to travel to [place] in 2 weeks for his [employer].  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

## 2022-09-28 NOTE — DATA REVIEWED
[de-identified] : MRI brain 1/8/2020 scattered R frontal foci of encephalomalacia and gliosis [de-identified] : EEG 1/2/2020 3 Hz spike-wave

## 2022-09-28 NOTE — ASSESSMENT
[FreeTextEntry1] : Generalized epilepsy with 3 breakthrough tonic-clonic seizures in the past 2 months.\par \par While the most recent one was likely triggered by heavy drinking and sleep deprivation, the other two had no clear trigger other than increased stress level and/or physical activity.  He is on the maximum dose of Keppra ER twice daily.  We will switch to brand name Keppra to see if this helps, but at this point I would also like to add a second AED.  Will add Depakote  mg BID as valproic acid is the most effective medication for generalized epilepsy and he was on it in [facility] before moving to the , with very good seizure control and no side effects.  Potential side effects were reviewed.  He will let me know if he develops any and we will check labs at next visit.  Seizure precautions were reviewed.  Long-term he may be able to transition back to Depakote monotherapy.\par \par RTC 6 weeks

## 2022-10-21 ENCOUNTER — NON-APPOINTMENT (OUTPATIENT)
Age: 32
End: 2022-10-21

## 2022-10-25 ENCOUNTER — TRANSCRIPTION ENCOUNTER (OUTPATIENT)
Age: 32
End: 2022-10-25

## 2022-10-26 ENCOUNTER — TRANSCRIPTION ENCOUNTER (OUTPATIENT)
Age: 32
End: 2022-10-26

## 2023-02-23 ENCOUNTER — RX RENEWAL (OUTPATIENT)
Age: 33
End: 2023-02-23

## 2023-03-23 ENCOUNTER — RX RENEWAL (OUTPATIENT)
Age: 33
End: 2023-03-23

## 2023-04-06 DIAGNOSIS — R41.840 ATTENTION AND CONCENTRATION DEFICIT: ICD-10-CM

## 2023-05-22 ENCOUNTER — RX RENEWAL (OUTPATIENT)
Age: 33
End: 2023-05-22

## 2023-08-16 ENCOUNTER — RX RENEWAL (OUTPATIENT)
Age: 33
End: 2023-08-16

## 2023-09-08 ENCOUNTER — APPOINTMENT (OUTPATIENT)
Dept: NEUROLOGY | Facility: CLINIC | Age: 33
End: 2023-09-08

## 2023-09-08 VITALS
HEART RATE: 43 BPM | OXYGEN SATURATION: 97 % | SYSTOLIC BLOOD PRESSURE: 125 MMHG | TEMPERATURE: 98 F | HEIGHT: 69 IN | DIASTOLIC BLOOD PRESSURE: 76 MMHG | WEIGHT: 175 LBS | BODY MASS INDEX: 25.92 KG/M2

## 2023-09-29 ENCOUNTER — APPOINTMENT (OUTPATIENT)
Dept: NEUROLOGY | Facility: CLINIC | Age: 33
End: 2023-09-29
Payer: COMMERCIAL

## 2023-09-29 PROCEDURE — 99213 OFFICE O/P EST LOW 20 MIN: CPT | Mod: 95

## 2023-09-29 RX ORDER — LEVETIRACETAM 750 MG/1
750 TABLET, FILM COATED, EXTENDED RELEASE ORAL TWICE DAILY
Qty: 360 | Refills: 1 | Status: DISCONTINUED | COMMUNITY
Start: 2020-06-19 | End: 2023-09-29

## 2023-11-13 ENCOUNTER — APPOINTMENT (OUTPATIENT)
Dept: NEUROLOGY | Facility: CLINIC | Age: 33
End: 2023-11-13
Payer: COMMERCIAL

## 2023-11-13 PROCEDURE — 90791 PSYCH DIAGNOSTIC EVALUATION: CPT

## 2023-11-13 PROCEDURE — 96138 PSYCL/NRPSYC TECH 1ST: CPT

## 2023-11-13 PROCEDURE — 96132 NRPSYC TST EVAL PHYS/QHP 1ST: CPT

## 2023-11-13 PROCEDURE — 96139 PSYCL/NRPSYC TST TECH EA: CPT

## 2023-11-13 PROCEDURE — 96133 NRPSYC TST EVAL PHYS/QHP EA: CPT

## 2023-12-04 ENCOUNTER — NON-APPOINTMENT (OUTPATIENT)
Age: 33
End: 2023-12-04

## 2023-12-13 ENCOUNTER — TRANSCRIPTION ENCOUNTER (OUTPATIENT)
Age: 33
End: 2023-12-13

## 2024-05-22 RX ORDER — LEVETIRACETAM 500 MG/1
500 TABLET, FILM COATED, EXTENDED RELEASE ORAL
Qty: 180 | Refills: 3 | Status: ACTIVE | COMMUNITY
Start: 2023-03-23 | End: 1900-01-01

## 2024-06-06 ENCOUNTER — TRANSCRIPTION ENCOUNTER (OUTPATIENT)
Age: 34
End: 2024-06-06

## 2024-06-07 ENCOUNTER — APPOINTMENT (OUTPATIENT)
Dept: NEUROLOGY | Facility: CLINIC | Age: 34
End: 2024-06-07
Payer: COMMERCIAL

## 2024-06-07 VITALS
HEART RATE: 52 BPM | OXYGEN SATURATION: 98 % | TEMPERATURE: 98.2 F | HEIGHT: 69 IN | BODY MASS INDEX: 25.18 KG/M2 | SYSTOLIC BLOOD PRESSURE: 136 MMHG | WEIGHT: 170 LBS | DIASTOLIC BLOOD PRESSURE: 87 MMHG

## 2024-06-07 DIAGNOSIS — G40.309 GENERALIZED IDIOPATHIC EPILEPSY AND EPILEPTIC SYNDROMES, NOT INTRACTABLE, W/OUT STATUS EPILEPTICUS: ICD-10-CM

## 2024-06-07 DIAGNOSIS — Z91.89 OTHER SPECIFIED PERSONAL RISK FACTORS, NOT ELSEWHERE CLASSIFIED: ICD-10-CM

## 2024-06-07 PROCEDURE — 99204 OFFICE O/P NEW MOD 45 MIN: CPT

## 2024-06-11 LAB
ALBUMIN SERPL ELPH-MCNC: 4.6 G/DL
ALP BLD-CCNC: 55 U/L
ALT SERPL-CCNC: 31 U/L
ANION GAP SERPL CALC-SCNC: 13 MMOL/L
AST SERPL-CCNC: 21 U/L
BILIRUB SERPL-MCNC: 0.3 MG/DL
BUN SERPL-MCNC: 16 MG/DL
CALCIUM SERPL-MCNC: 9.5 MG/DL
CHLORIDE SERPL-SCNC: 102 MMOL/L
CO2 SERPL-SCNC: 26 MMOL/L
CREAT SERPL-MCNC: 0.85 MG/DL
EGFR: 118 ML/MIN/1.73M2
GLUCOSE SERPL-MCNC: 94 MG/DL
HCT VFR BLD CALC: 40.7 %
HGB BLD-MCNC: 13.4 G/DL
LEVETIRACETAM SERPL-MCNC: 40 UG/ML
MCHC RBC-ENTMCNC: 30.1 PG
MCHC RBC-ENTMCNC: 32.9 GM/DL
MCV RBC AUTO: 91.5 FL
PLATELET # BLD AUTO: 186 K/UL
POTASSIUM SERPL-SCNC: 4.4 MMOL/L
PROT SERPL-MCNC: 7 G/DL
RBC # BLD: 4.45 M/UL
RBC # FLD: 11.9 %
SODIUM SERPL-SCNC: 141 MMOL/L
VALPROATE SERPL-MCNC: 86 UG/ML
WBC # FLD AUTO: 4.84 K/UL

## 2024-06-21 RX ORDER — DIVALPROEX SODIUM 250 MG/1
250 TABLET, EXTENDED RELEASE ORAL
Qty: 180 | Refills: 3 | Status: ACTIVE | COMMUNITY
Start: 2022-09-28 | End: 1900-01-01

## 2024-06-26 ENCOUNTER — APPOINTMENT (OUTPATIENT)
Dept: NEUROLOGY | Facility: CLINIC | Age: 34
End: 2024-06-26

## 2024-06-26 PROBLEM — Z91.89 AT RISK FOR FERTILITY PROBLEMS: Status: ACTIVE | Noted: 2024-06-26

## 2024-06-26 PROCEDURE — 95819 EEG AWAKE AND ASLEEP: CPT

## 2024-06-26 NOTE — END OF VISIT
[FreeTextEntry3] : I, Genny Henriquez, attest that this documentation has been prepared under the direction in and the presence of provider Otoniel Calderon MD.

## 2024-06-26 NOTE — DISCUSSION/SUMMARY
[FreeTextEntry1] : Impression: 1) Generalized epilepsy - history of GTCs since age 15, well controlled on Keppra & Depakote  2) Infertility - question of male infertility due to Depakote use   Plan: 1) Keppra & Depakote levels  2) Refer to reproductive endocrinology to determine if there are true infertility issues. If not, can consider EMU admission to cross Depakote with another AED 3) r/aEEG for baseline to start

## 2024-06-26 NOTE — HISTORY OF PRESENT ILLNESS
[FreeTextEntry1] : He is a 33 year old male presenting for a follow up visit for epilepsy. Last seen by Dr. Lujan on 9/29/2023.  Interested in discussing medications due to infertility issues.   Last seizures in 2022. Occurred the day after his wedding. Attributed to heavy drinking and sleep deprivation. Added Depakote after this event, first 250mg BID. Another seizure one month later, increased Depakote to 750mg BID and no events since. Denies side effects from meds.   Most seizures in the setting of drinking/sleep deprivation or after long runs, however has had events that seemed to be unprovoked.   Never tried any other medications.   Meds: Keppra ER 1500mg BID Depakote 750mg BID

## 2024-06-26 NOTE — PHYSICAL EXAM
[FreeTextEntry1] : General: Constitutional: Sitting comfortably in NAD Psychiatric: well-groomed, appropriate affect, insight/judgement intact Ears, Nose, Throat: no abnormalities, mucus membranes moist Extremities: no edema, clubbing, or cyanosis Skin: no rash or neurocutaneous signs  Cognitive: Orientation, language, memory and knowledge screens intact Cranial Nerves: II: Full to confrontation; disc margins sharp. III/IV/VI: PERRL EOMI, no nystagmus V1V2V3: Symmetric. VII: Face appears symmetric. VIII: Normal to screening, IX/X: Palate elevates symmetrical. XI: Trapezius symmetric. XII: Tongue midline  Motor: Power: 5/5 throughout Tone: Normal x4 limbs Tremor: bilateral hand tremor   Sensation: intact to light touch  Coordination/Gait: Finger-nose-finger intact, normal rapid-alternating movements Fine motor normal with normal rapid finger taps and heel tapping Romberg negative  Reflexes: DTR: 2+ symmetric x4 limbs

## 2024-06-27 ENCOUNTER — APPOINTMENT (OUTPATIENT)
Dept: NEUROLOGY | Facility: CLINIC | Age: 34
End: 2024-06-27

## 2024-06-27 PROCEDURE — 95708 EEG WO VID EA 12-26HR UNMNTR: CPT

## 2024-06-27 PROCEDURE — 95719 EEG PHYS/QHP EA INCR W/O VID: CPT

## 2024-06-27 PROCEDURE — 95700 EEG CONT REC W/VID EEG TECH: CPT

## 2024-06-28 ENCOUNTER — TRANSCRIPTION ENCOUNTER (OUTPATIENT)
Age: 34
End: 2024-06-28

## 2024-07-08 ENCOUNTER — TRANSCRIPTION ENCOUNTER (OUTPATIENT)
Age: 34
End: 2024-07-08

## 2024-07-12 ENCOUNTER — TRANSCRIPTION ENCOUNTER (OUTPATIENT)
Age: 34
End: 2024-07-12

## 2024-07-16 ENCOUNTER — TRANSCRIPTION ENCOUNTER (OUTPATIENT)
Age: 34
End: 2024-07-16

## 2024-07-17 ENCOUNTER — TRANSCRIPTION ENCOUNTER (OUTPATIENT)
Age: 34
End: 2024-07-17

## 2024-07-18 ENCOUNTER — TRANSCRIPTION ENCOUNTER (OUTPATIENT)
Age: 34
End: 2024-07-18

## 2024-07-19 ENCOUNTER — TRANSCRIPTION ENCOUNTER (OUTPATIENT)
Age: 34
End: 2024-07-19

## 2024-08-02 ENCOUNTER — APPOINTMENT (OUTPATIENT)
Dept: NEUROLOGY | Facility: CLINIC | Age: 34
End: 2024-08-02
Payer: COMMERCIAL

## 2024-08-02 VITALS
SYSTOLIC BLOOD PRESSURE: 121 MMHG | DIASTOLIC BLOOD PRESSURE: 82 MMHG | BODY MASS INDEX: 26.66 KG/M2 | HEART RATE: 50 BPM | WEIGHT: 180 LBS | OXYGEN SATURATION: 97 % | HEIGHT: 69 IN | TEMPERATURE: 97.2 F

## 2024-08-02 DIAGNOSIS — G40.309 GENERALIZED IDIOPATHIC EPILEPSY AND EPILEPTIC SYNDROMES, NOT INTRACTABLE, W/OUT STATUS EPILEPTICUS: ICD-10-CM

## 2024-08-02 DIAGNOSIS — R41.840 ATTENTION AND CONCENTRATION DEFICIT: ICD-10-CM

## 2024-08-02 PROCEDURE — 99214 OFFICE O/P EST MOD 30 MIN: CPT

## 2024-08-02 NOTE — DISCUSSION/SUMMARY
[FreeTextEntry1] : Impression: 1) Generalized epilepsy - history of GTCs since age 15, well controlled on Keppra & Depakote and feels confident on these two at these doses, although it is possible depakote alone may be sufficient.  There may be other options, such as zonisamide.  2) Infertility - question of male infertility due to Depakote use  3) does ultra-running which is sometimes no sleep for 24+ hours; also diving.  Plan: 1) 48 hours ambEEG  but with stress test with no sleep and pushing himself on this regimen - would do the first night of hard running etc without the EEG then come in the AM for rEEG/ambEEG with continued sleep deprivation.

## 2024-08-02 NOTE — PHYSICAL EXAM
[FreeTextEntry1] : General: Constitutional:  Sitting comfortably in NAD. Psychiatric: well-groomed, appropriate affect Ears, Nose, Throat: no abnormalities, mucus membranes moist Neck: supple Extremities: no edema, clubbing or cyanosis Skin: no rash or neuro-cutaneous signs   Cognitive: Orientation, language, memory and knowledge screens intact.  Cranial Nerves: II: LOUIE. III/IV/VI: EOM Full.  VII: Face appears symmetric VIII: Normal to screening IX/X: normal phonation  XI: Trapezius Symmetric  XII: Tongue midline Motor: Power: no pronator drift  Narrow based gait

## 2024-08-02 NOTE — HISTORY OF PRESENT ILLNESS
[FreeTextEntry1] : He is a 33 year old male presenting for a follow up visit for epilepsy. Last seen by Dr. Lujan on Jun 7, 2024.  Interested in discussing medications due to infertility issues, with known generalized epilepsy and attention deficits.  Febrile seizures as a child.  Seizure free till 14-16 y.o. when he had a seizure after a long flight, likely GTC.  On Keppra at that time, and has been increased with subsequent seizures, now at 1500mg bid.  Some depakote use as a teenager, but stopped sometime.  Last seizures in 2022. Occurred the day after his wedding. Attributed to heavy drinking and sleep deprivation. Added Depakote after this event, first 250mg BID. Another seizure one month later, increased Depakote to 750mg BID and no events since. Denies side effects from meds.  Most seizures in the setting of drinking/sleep deprivation or after long runs, however has had events that seemed to be unprovoked. Not been on other medications.  Meds: Keppra ER 1500mg BID Depakote 750mg BID

## 2024-08-05 ENCOUNTER — TRANSCRIPTION ENCOUNTER (OUTPATIENT)
Age: 34
End: 2024-08-05

## 2024-08-05 ENCOUNTER — RX RENEWAL (OUTPATIENT)
Age: 34
End: 2024-08-05

## 2024-08-09 ENCOUNTER — TRANSCRIPTION ENCOUNTER (OUTPATIENT)
Age: 34
End: 2024-08-09

## 2024-09-12 ENCOUNTER — APPOINTMENT (OUTPATIENT)
Dept: UROLOGY | Facility: CLINIC | Age: 34
End: 2024-09-12

## 2024-09-12 PROCEDURE — 99203 OFFICE O/P NEW LOW 30 MIN: CPT

## 2024-09-12 NOTE — HISTORY OF PRESENT ILLNESS
[FreeTextEntry1] : CC: Weak Stream  34 year old male with years of a weak slow stream.  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 Denies any incontinence but does report mild urgency and has to tug on urethra to help with the flow Feels like he empties to completion. No hematuria, no dysuria  No  surgery or trauma  He is currently trying to conceive with his wife (32 years) for the past 4 months and is consider IUI. No children currently, Had to provide a semen sample and was told volume was low.  PVR today was 22 cc  Surgical Hx: none Social Hx: nonsmoker, Rare ETOH Family Hx: noncontributory

## 2024-09-12 NOTE — ASSESSMENT
[FreeTextEntry1] : Diagnosis:  Weak flow  Plan: PVR was 22 cc Reassurance provided If concerned about semen volume would recommend follow up with Dr. Tang  RTC as needed    Kalin Monroe MD, FACS, FRCS  of Urology [facility] Director of Laparoscopic and Robotic Surgery [facility] Director of Urology, [facility] Professor of Urology (Office) [phone] (Cell) [phone] [email or web address]   I performed history/review of imaging, discussed treatment plan with patient, agree with above transcription by KELLI.

## 2024-09-13 LAB
APPEARANCE: CLEAR
BACTERIA: NEGATIVE /HPF
BILIRUBIN URINE: NEGATIVE
BLOOD URINE: NEGATIVE
CAST: 0 /LPF
COLOR: YELLOW
EPITHELIAL CELLS: 0 /HPF
GLUCOSE QUALITATIVE U: NEGATIVE MG/DL
KETONES URINE: NEGATIVE MG/DL
LEUKOCYTE ESTERASE URINE: NEGATIVE
MICROSCOPIC-UA: NORMAL
NITRITE URINE: NEGATIVE
PH URINE: 7.5
PROTEIN URINE: NEGATIVE MG/DL
RED BLOOD CELLS URINE: 1 /HPF
SPECIFIC GRAVITY URINE: 1.02
UROBILINOGEN URINE: 0.2 MG/DL
WHITE BLOOD CELLS URINE: 5 /HPF

## 2024-10-22 ENCOUNTER — TRANSCRIPTION ENCOUNTER (OUTPATIENT)
Age: 34
End: 2024-10-22

## 2024-10-25 ENCOUNTER — TRANSCRIPTION ENCOUNTER (OUTPATIENT)
Age: 34
End: 2024-10-25

## 2024-10-29 ENCOUNTER — RX RENEWAL (OUTPATIENT)
Age: 34
End: 2024-10-29

## 2024-11-08 NOTE — ED ADULT NURSE NOTE - CAS TRG GENERAL AIRWAY, MLM
Patient has persistent depression which is mild and is currently controlled. Will Continue anti-depressant medications. We will not consult psychiatry at this time. Patient does not display psychosis at this time. Continue to monitor closely and adjust plan of care as needed. Start home meds    DULoxetine (CYMBALTA) 60 MG capsule    Patent

## 2024-11-12 ENCOUNTER — RX RENEWAL (OUTPATIENT)
Age: 34
End: 2024-11-12

## 2024-12-03 ENCOUNTER — TRANSCRIPTION ENCOUNTER (OUTPATIENT)
Age: 34
End: 2024-12-03

## 2025-01-08 ENCOUNTER — RX RENEWAL (OUTPATIENT)
Age: 35
End: 2025-01-08

## 2025-02-21 ENCOUNTER — TRANSCRIPTION ENCOUNTER (OUTPATIENT)
Age: 35
End: 2025-02-21

## 2025-02-25 DIAGNOSIS — G40.309 GENERALIZED IDIOPATHIC EPILEPSY AND EPILEPTIC SYNDROMES, NOT INTRACTABLE, W/OUT STATUS EPILEPTICUS: ICD-10-CM

## 2025-02-26 ENCOUNTER — TRANSCRIPTION ENCOUNTER (OUTPATIENT)
Age: 35
End: 2025-02-26

## 2025-02-28 NOTE — ED PROVIDER NOTE - CONSTITUTIONAL NEGATIVE STATEMENT, MLM
Addended by: ADRIAN SEGOVIA on: 2/28/2025 08:51 AM     Modules accepted: Urvashi     no fever and no chills.

## 2025-03-07 ENCOUNTER — RX RENEWAL (OUTPATIENT)
Age: 35
End: 2025-03-07

## 2025-03-17 ENCOUNTER — APPOINTMENT (OUTPATIENT)
Dept: NEUROLOGY | Facility: CLINIC | Age: 35
End: 2025-03-17

## 2025-03-18 ENCOUNTER — APPOINTMENT (OUTPATIENT)
Dept: NEUROLOGY | Facility: CLINIC | Age: 35
End: 2025-03-18

## 2025-03-24 ENCOUNTER — RX RENEWAL (OUTPATIENT)
Age: 35
End: 2025-03-24

## 2025-04-03 ENCOUNTER — APPOINTMENT (OUTPATIENT)
Dept: NEUROLOGY | Facility: CLINIC | Age: 35
End: 2025-04-03
Payer: COMMERCIAL

## 2025-04-03 VITALS
BODY MASS INDEX: 25.92 KG/M2 | HEART RATE: 59 BPM | OXYGEN SATURATION: 96 % | WEIGHT: 175 LBS | DIASTOLIC BLOOD PRESSURE: 84 MMHG | HEIGHT: 69 IN | SYSTOLIC BLOOD PRESSURE: 131 MMHG | TEMPERATURE: 95.9 F

## 2025-04-03 DIAGNOSIS — G40.309 GENERALIZED IDIOPATHIC EPILEPSY AND EPILEPTIC SYNDROMES, NOT INTRACTABLE, W/OUT STATUS EPILEPTICUS: ICD-10-CM

## 2025-04-03 DIAGNOSIS — R41.840 ATTENTION AND CONCENTRATION DEFICIT: ICD-10-CM

## 2025-04-03 PROCEDURE — 99214 OFFICE O/P EST MOD 30 MIN: CPT

## 2025-04-09 ENCOUNTER — TRANSCRIPTION ENCOUNTER (OUTPATIENT)
Age: 35
End: 2025-04-09

## 2025-04-09 RX ORDER — LISDEXAMFETAMINE DIMESYLATE 10 MG/1
10 CAPSULE ORAL
Qty: 30 | Refills: 0 | Status: ACTIVE | COMMUNITY
Start: 2025-04-09 | End: 1900-01-01

## 2025-05-05 ENCOUNTER — RX RENEWAL (OUTPATIENT)
Age: 35
End: 2025-05-05

## 2025-05-13 ENCOUNTER — TRANSCRIPTION ENCOUNTER (OUTPATIENT)
Age: 35
End: 2025-05-13

## 2025-05-14 ENCOUNTER — TRANSCRIPTION ENCOUNTER (OUTPATIENT)
Age: 35
End: 2025-05-14

## 2025-05-15 ENCOUNTER — TRANSCRIPTION ENCOUNTER (OUTPATIENT)
Age: 35
End: 2025-05-15

## 2025-06-06 ENCOUNTER — TRANSCRIPTION ENCOUNTER (OUTPATIENT)
Age: 35
End: 2025-06-06

## 2025-06-22 NOTE — HISTORY OF PRESENT ILLNESS
[FreeTextEntry1] : Doing well since last visit.  Taking Keppra XR 1g BID without side effects.  Completed a 100 km race without seizures or any other issues.  Now thinking about doing a longer one.  Also recently got engaged.
Statement Selected

## 2025-07-21 ENCOUNTER — TRANSCRIPTION ENCOUNTER (OUTPATIENT)
Age: 35
End: 2025-07-21

## 2025-07-21 ENCOUNTER — RX RENEWAL (OUTPATIENT)
Age: 35
End: 2025-07-21

## 2025-08-21 ENCOUNTER — TRANSCRIPTION ENCOUNTER (OUTPATIENT)
Age: 35
End: 2025-08-21

## 2025-09-03 ENCOUNTER — RX RENEWAL (OUTPATIENT)
Age: 35
End: 2025-09-03